# Patient Record
Sex: MALE | Race: NATIVE HAWAIIAN OR OTHER PACIFIC ISLANDER | Employment: FULL TIME | URBAN - METROPOLITAN AREA
[De-identification: names, ages, dates, MRNs, and addresses within clinical notes are randomized per-mention and may not be internally consistent; named-entity substitution may affect disease eponyms.]

---

## 2017-05-03 ENCOUNTER — HOSPITAL ENCOUNTER (EMERGENCY)
Facility: CLINIC | Age: 47
Discharge: HOME | End: 2017-05-03
Attending: EMERGENCY MEDICINE | Admitting: EMERGENCY MEDICINE
Payer: COMMERCIAL

## 2017-05-03 VITALS
DIASTOLIC BLOOD PRESSURE: 94 MMHG | SYSTOLIC BLOOD PRESSURE: 125 MMHG | TEMPERATURE: 98 F | OXYGEN SATURATION: 97 % | RESPIRATION RATE: 20 BRPM

## 2017-05-03 DIAGNOSIS — M54.9 ACUTE BACK PAIN, UNSPECIFIED BACK LOCATION, UNSPECIFIED BACK PAIN LATERALITY: ICD-10-CM

## 2017-05-03 LAB
ALBUMIN UR-MCNC: NEGATIVE MG/DL
ANION GAP SERPL CALCULATED.3IONS-SCNC: 6 MMOL/L (ref 3–14)
APPEARANCE UR: CLEAR
BASOPHILS # BLD AUTO: 0.1 10E9/L (ref 0–0.2)
BASOPHILS NFR BLD AUTO: 0.6 %
BILIRUB UR QL STRIP: NEGATIVE
BUN SERPL-MCNC: 16 MG/DL (ref 7–30)
CALCIUM SERPL-MCNC: 8.9 MG/DL (ref 8.5–10.1)
CHLORIDE SERPL-SCNC: 106 MMOL/L (ref 94–109)
CO2 SERPL-SCNC: 28 MMOL/L (ref 20–32)
COLOR UR AUTO: YELLOW
CREAT SERPL-MCNC: 1 MG/DL (ref 0.66–1.25)
DIFFERENTIAL METHOD BLD: NORMAL
EOSINOPHIL # BLD AUTO: 0.3 10E9/L (ref 0–0.7)
EOSINOPHIL NFR BLD AUTO: 3.1 %
ERYTHROCYTE [DISTWIDTH] IN BLOOD BY AUTOMATED COUNT: 12.6 % (ref 10–15)
GFR SERPL CREATININE-BSD FRML MDRD: 79 ML/MIN/1.7M2
GLUCOSE SERPL-MCNC: 113 MG/DL (ref 70–99)
GLUCOSE UR STRIP-MCNC: NEGATIVE MG/DL
HCT VFR BLD AUTO: 42.8 % (ref 40–53)
HGB BLD-MCNC: 14.5 G/DL (ref 13.3–17.7)
HGB UR QL STRIP: NEGATIVE
IMM GRANULOCYTES # BLD: 0 10E9/L (ref 0–0.4)
IMM GRANULOCYTES NFR BLD: 0.3 %
KETONES UR STRIP-MCNC: NEGATIVE MG/DL
LEUKOCYTE ESTERASE UR QL STRIP: NEGATIVE
LYMPHOCYTES # BLD AUTO: 2.5 10E9/L (ref 0.8–5.3)
LYMPHOCYTES NFR BLD AUTO: 28.1 %
MCH RBC QN AUTO: 29 PG (ref 26.5–33)
MCHC RBC AUTO-ENTMCNC: 33.9 G/DL (ref 31.5–36.5)
MCV RBC AUTO: 86 FL (ref 78–100)
MONOCYTES # BLD AUTO: 1.1 10E9/L (ref 0–1.3)
MONOCYTES NFR BLD AUTO: 12.6 %
MUCOUS THREADS #/AREA URNS LPF: PRESENT /LPF
NEUTROPHILS # BLD AUTO: 4.9 10E9/L (ref 1.6–8.3)
NEUTROPHILS NFR BLD AUTO: 55.3 %
NITRATE UR QL: NEGATIVE
NRBC # BLD AUTO: 0 10*3/UL
NRBC BLD AUTO-RTO: 0 /100
PH UR STRIP: 6 PH (ref 5–7)
PLATELET # BLD AUTO: 349 10E9/L (ref 150–450)
POTASSIUM SERPL-SCNC: 3.5 MMOL/L (ref 3.4–5.3)
RBC # BLD AUTO: 5 10E12/L (ref 4.4–5.9)
RBC #/AREA URNS AUTO: <1 /HPF (ref 0–2)
SODIUM SERPL-SCNC: 140 MMOL/L (ref 133–144)
SP GR UR STRIP: 1.02 (ref 1–1.03)
SQUAMOUS #/AREA URNS AUTO: <1 /HPF (ref 0–1)
URN SPEC COLLECT METH UR: ABNORMAL
UROBILINOGEN UR STRIP-MCNC: 0 MG/DL (ref 0–2)
WBC # BLD AUTO: 8.8 10E9/L (ref 4–11)
WBC #/AREA URNS AUTO: <1 /HPF (ref 0–2)

## 2017-05-03 PROCEDURE — 99285 EMERGENCY DEPT VISIT HI MDM: CPT | Mod: 25

## 2017-05-03 PROCEDURE — 96361 HYDRATE IV INFUSION ADD-ON: CPT

## 2017-05-03 PROCEDURE — 81001 URINALYSIS AUTO W/SCOPE: CPT | Performed by: EMERGENCY MEDICINE

## 2017-05-03 PROCEDURE — 96374 THER/PROPH/DIAG INJ IV PUSH: CPT

## 2017-05-03 PROCEDURE — 85025 COMPLETE CBC W/AUTO DIFF WBC: CPT | Performed by: EMERGENCY MEDICINE

## 2017-05-03 PROCEDURE — 80048 BASIC METABOLIC PNL TOTAL CA: CPT | Performed by: EMERGENCY MEDICINE

## 2017-05-03 PROCEDURE — 25000128 H RX IP 250 OP 636: Performed by: EMERGENCY MEDICINE

## 2017-05-03 RX ORDER — SODIUM CHLORIDE 9 MG/ML
1000 INJECTION, SOLUTION INTRAVENOUS CONTINUOUS
Status: DISCONTINUED | OUTPATIENT
Start: 2017-05-03 | End: 2017-05-03 | Stop reason: HOSPADM

## 2017-05-03 RX ORDER — METHYLPREDNISOLONE 4 MG
4 TABLET, DOSE PACK ORAL SEE ADMIN INSTRUCTIONS
Qty: 21 TABLET | Refills: 0 | Status: SHIPPED | OUTPATIENT
Start: 2017-05-03 | End: 2017-11-18

## 2017-05-03 RX ORDER — HYDROMORPHONE HYDROCHLORIDE 1 MG/ML
0.5 INJECTION, SOLUTION INTRAMUSCULAR; INTRAVENOUS; SUBCUTANEOUS
Status: DISCONTINUED | OUTPATIENT
Start: 2017-05-03 | End: 2017-05-03 | Stop reason: HOSPADM

## 2017-05-03 RX ORDER — IBUPROFEN 800 MG/1
800 TABLET, FILM COATED ORAL EVERY 8 HOURS PRN
Qty: 60 TABLET | Refills: 0 | Status: SHIPPED | OUTPATIENT
Start: 2017-05-03 | End: 2017-05-11

## 2017-05-03 RX ORDER — CYCLOBENZAPRINE HCL 10 MG
10 TABLET ORAL 3 TIMES DAILY PRN
Qty: 20 TABLET | Refills: 0 | Status: SHIPPED | OUTPATIENT
Start: 2017-05-03 | End: 2017-05-09

## 2017-05-03 RX ADMIN — SODIUM CHLORIDE 1000 ML: 9 INJECTION, SOLUTION INTRAVENOUS at 17:38

## 2017-05-03 RX ADMIN — HYDROMORPHONE HYDROCHLORIDE 0.5 MG: 1 INJECTION, SOLUTION INTRAMUSCULAR; INTRAVENOUS; SUBCUTANEOUS at 17:38

## 2017-05-03 NOTE — ED NOTES
Pt had sudden onset of left flank pain this AM which radiates around to anterior abdomen and down to buttock.

## 2017-05-03 NOTE — ED AVS SNAPSHOT
[facility] Emergency Department    [address]    [facility] [address]    Phone:  [phone]    Fax:  [phone]                                       Zeke Treadwell   MRN: [id number]    Department:  [facility] Emergency Department   Date of Visit:  5/3/2017           After Visit Summary Signature Page     I have received my discharge instructions, and my questions have been answered. I have discussed any challenges I see with this plan with the nurse or doctor.    ..........................................................................................................................................  Patient/Patient Representative Signature      ..........................................................................................................................................  Patient Representative Print Name and Relationship to Patient    ..................................................               ................................................  Date                                            Time    ..........................................................................................................................................  Reviewed by Signature/Title    ...................................................              ..............................................  Date                                                            Time

## 2017-05-03 NOTE — LETTER
[facility] EMERGENCY DEPARTMENT  [address]  [place] [address]  [phone]    May 3, 2017    Zeke Treadwell  [address] [place] [address]  [phone] (home)     : 1970      To Whom it may concern:    Zkee Treadwell was seen in our Emergency Department today, May 3, 2017.  I expect his condition to improve over the next 3 days.  He may return to work/school when improved.    Sincerely,        Lena Malone

## 2017-05-03 NOTE — ED AVS SNAPSHOT
[facility] Emergency Department    [address] [place] [address]    Phone:  [phone]    Fax:  [phone]                                       Zeke Treadwell   MRN: [id number]    Department:  [facility] Emergency Department   Date of Visit:  5/3/2017           Patient Information     Date Of Birth          1970        Your diagnoses for this visit were:     Acute back pain, unspecified back location, unspecified back pain laterality        You were seen by Sharonda Vences MD and Martinez Delgado MD.      Follow-up Information     Follow up with your doctor. Call in 3 days.        Discharge Instructions         Discharge Instructions  Back Pain  You were seen today for back pain. Back pain can have many causes, but most will get better without surgery or other specific treatment. Sometimes there is a herniated ( slipped ) disc. We don t usually do MRI scans to look for these right away, since most herniated discs will get better on their own with time.  Today, we did not find any evidence that your back pain was caused by a serious condition, such as an infection, fracture, or tumor. However, sometimes symptoms develop over time and cannot be found during an emergency visit, so it is very important that you follow up with your primary doctor.  Return to the Emergency Department if:    You develop a fever with your back pain.     You have weakness or change in sensation in one or both legs.    You lose control of your bowels or bladder, or can t empty your bladder.    Your pain gets much worse.     Follow-up with your doctor:    Unless your pain has completely gone away, please make an appointment with your doctor within one week.  You may need further management of your back pain, such as more pain medication, imaging such as an X-ray or MRI, or physical therapy.    What can I do to help myself?    Remain Active -- People are often afraid that they will  hurt their back further or delay recovery by remaining active, but this is one of the best things you can do for your back. In fact, prolonged bed rest is not recommended. Studies have shown that people with low back pain recover faster when they remain active. Movement helps to bring blood flow to the muscles and relieve muscle spasms as well as preventing loss of muscle strength.    Heat -- Using a heating pad can help with low back pain during the first few weeks. Do not sleep with a heating pad, as you can be burned.     Pain medications - You may take a pain medication such as Tylenol  (acetaminophen), Advil , Nuprin  (ibuprofen) or Aleve  (naproxen).  If you have been given a narcotic such as Vicodin  (hydrocodone with acetaminophen), Percocet  (oxycodone with acetaminophen), codeine, or a muscle relaxant such as Flexeril  (cyclobenzaprine) or Soma  (carisoprodol), do not drive for four hours after you have taken it. If the narcotic contains Tylenol  (acetaminophen), do not take Tylenol  with it. All narcotics will cause constipation, so eat a high fiber diet.   If you were given a prescription for medicine here today, be sure to read all of the information (including the package insert) that comes with your prescription.  This will include important information about the medicine, its side effects, and any warnings that you need to know about.  The pharmacist who fills the prescription can provide more information and answer questions you may have about the medicine.  If you have questions or concerns that the pharmacist cannot address, please call or return to the Emergency Department.   Opioid Medication Information    Pain medications are among the most commonly prescribed medicines, so we are including this information for all our patients. If you did not receive pain medication or get a prescription for pain medicine, you can ignore it.     You may have been given a prescription for an opioid (narcotic)  pain medicine and/or have received a pain medicine while here in the Emergency Department. These medicines can make you drowsy or impaired. You must not drive, operate dangerous equipment, or engage in any other dangerous activities while taking these medications. If you drive while taking these medications, you could be arrested for DUI, or driving under the influence. Do not drink any alcohol while you are taking these medications.     Opioid pain medications can cause addiction. If you have a history of chemical dependency of any type, you are at a higher risk of becoming addicted to pain medications.  Only take these prescribed medications to treat your pain when all other options have been tried. Take it for as short a time and as few doses as possible. Store your pain pills in a secure place, as they are frequently stolen and provide a dangerous opportunity for children or visitors in your house to start abusing these powerful medications. We will not replace any lost or stolen medicine.  As soon as your pain is better, you should flush all your remaining medication.     Many prescription pain medications contain Tylenol  (acetaminophen), including Vicodin , Tylenol #3 , Norco , Lortab , and Percocet .  You should not take any extra pills of Tylenol  if you are using these prescription medications or you can get very sick.  Do not ever take more than 3000 mg of acetaminophen in any 24 hour period.    All opioids tend to cause constipation. Drink plenty of water and eat foods that have a lot of fiber, such as fruits, vegetables, prune juice, apple juice and high fiber cereal.  Take a laxative if you don t move your bowels at least every other day. Miralax , Milk of Magnesia, Colace , or Senna  can be used to keep you regular.      Remember that you can always come back to the Emergency Department if you are not able to see your regular doctor in the amount of time listed above, if you get any new symptoms, or  if there is anything that worries you.        24 Hour Appointment Hotline       To make an appointment at any [facility], call [phone] ([phone]). If you don't have a family doctor or clinic, we will help you find one. [facility] clinics are conveniently located to serve the needs of you and your family.             Review of your medicines      START taking        Dose / Directions Last dose taken    cyclobenzaprine 10 MG tablet   Commonly known as:  FLEXERIL   Dose:  10 mg   Quantity:  20 tablet        Take 1 tablet (10 mg) by mouth 3 times daily as needed for muscle spasms   Refills:  0        ibuprofen 800 MG tablet   Commonly known as:  ADVIL/MOTRIN   Dose:  800 mg   Quantity:  60 tablet        Take 1 tablet (800 mg) by mouth every 8 hours as needed for moderate pain   Refills:  0        methylPREDNISolone 4 MG tablet   Commonly known as:  MEDROL DOSEPAK   Dose:  4 mg   Quantity:  21 tablet        Take 1 tablet (4 mg) by mouth See Admin Instructions   Refills:  0                Prescriptions were sent or printed at these locations (3 Prescriptions)                   Other Prescriptions                Printed at Department/Unit printer (3 of 3)         cyclobenzaprine (FLEXERIL) 10 MG tablet               methylPREDNISolone (MEDROL DOSEPAK) 4 MG tablet               ibuprofen (ADVIL/MOTRIN) 800 MG tablet                Procedures and tests performed during your visit     Basic metabolic panel    CBC with platelets differential    UA with Microscopic      Orders Needing Specimen Collection     None      Pending Results     No orders found from 5/1/2017 to 5/4/2017.            Pending Culture Results     No orders found from 5/1/2017 to 5/4/2017.            Pending Results Instructions     If you had any lab results that were not finalized at the time of your Discharge, you can call the ED Lab Result RN at [phone]. You will be contacted by this team for any positive Lab results or changes in  treatment. The nurses are available 7 days a week from 10A to 6:30P.  You can leave a message 24 hours per day and they will return your call.        Test Results From Your Hospital Stay        5/3/2017  5:13 PM      Component Results     Component Value Ref Range & Units Status    Color Urine Yellow  Final    Appearance Urine Clear  Final    Glucose Urine Negative NEG mg/dL Final    Bilirubin Urine Negative NEG Final    Ketones Urine Negative NEG mg/dL Final    Specific Gravity Urine 1.024 1.003 - 1.035 Final    Blood Urine Negative NEG Final    pH Urine 6.0 5.0 - 7.0 pH Final    Protein Albumin Urine Negative NEG mg/dL Final    Urobilinogen mg/dL 0.0 0.0 - 2.0 mg/dL Final    Nitrite Urine Negative NEG Final    Leukocyte Esterase Urine Negative NEG Final    Source Midstream Urine  Final    WBC Urine <1 0 - 2 /HPF Final    RBC Urine <1 0 - 2 /HPF Final    Squamous Epithelial /HPF Urine <1 0 - 1 /HPF Final    Mucous Urine Present (A) NEG /LPF Final         5/3/2017  5:46 PM      Component Results     Component Value Ref Range & Units Status    WBC 8.8 4.0 - 11.0 10e9/L Final    RBC Count 5.00 4.4 - 5.9 10e12/L Final    Hemoglobin 14.5 13.3 - 17.7 g/dL Final    Hematocrit 42.8 40.0 - 53.0 % Final    MCV 86 78 - 100 fl Final    MCH 29.0 26.5 - 33.0 pg Final    MCHC 33.9 31.5 - 36.5 g/dL Final    RDW 12.6 10.0 - 15.0 % Final    Platelet Count 349 150 - 450 10e9/L Final    Diff Method Automated Method  Final    % Neutrophils 55.3 % Final    % Lymphocytes 28.1 % Final    % Monocytes 12.6 % Final    % Eosinophils 3.1 % Final    % Basophils 0.6 % Final    % Immature Granulocytes 0.3 % Final    Nucleated RBCs 0 0 /100 Final    Absolute Neutrophil 4.9 1.6 - 8.3 10e9/L Final    Absolute Lymphocytes 2.5 0.8 - 5.3 10e9/L Final    Absolute Monocytes 1.1 0.0 - 1.3 10e9/L Final    Absolute Eosinophils 0.3 0.0 - 0.7 10e9/L Final    Absolute Basophils 0.1 0.0 - 0.2 10e9/L Final    Abs Immature Granulocytes 0.0 0 - 0.4 10e9/L Final     Absolute Nucleated RBC 0.0  Final         5/3/2017  6:02 PM      Component Results     Component Value Ref Range & Units Status    Sodium 140 133 - 144 mmol/L Final    Potassium 3.5 3.4 - 5.3 mmol/L Final    Chloride 106 94 - 109 mmol/L Final    Carbon Dioxide 28 20 - 32 mmol/L Final    Anion Gap 6 3 - 14 mmol/L Final    Glucose 113 (H) 70 - 99 mg/dL Final    Urea Nitrogen 16 7 - 30 mg/dL Final    Creatinine 1.00 0.66 - 1.25 mg/dL Final    GFR Estimate 79 >60 mL/min/1.7m2 Final    Non  GFR Calc    GFR Estimate If Black >90   GFR Calc   >60 mL/min/1.7m2 Final    Calcium 8.9 8.5 - 10.1 mg/dL Final                Clinical Quality Measure: Blood Pressure Screening     Your blood pressure was checked while you were in the emergency department today. The last reading we obtained was  BP: (!) 126/91 . Please read the guidelines below about what these numbers mean and what you should do about them.  If your systolic blood pressure (the top number) is less than 120 and your diastolic blood pressure (the bottom number) is less than 80, then your blood pressure is normal. There is nothing more that you need to do about it.  If your systolic blood pressure (the top number) is 120-139 or your diastolic blood pressure (the bottom number) is 80-89, your blood pressure may be higher than it should be. You should have your blood pressure rechecked within a year by a primary care provider.  If your systolic blood pressure (the top number) is 140 or greater or your diastolic blood pressure (the bottom number) is 90 or greater, you may have high blood pressure. High blood pressure is treatable, but if left untreated over time it can put you at risk for heart attack, stroke, or kidney failure. You should have your blood pressure rechecked by a primary care provider within the next 4 weeks.  If your provider in the emergency department today gave you specific instructions to follow-up with your doctor or  "provider even sooner than that, you should follow that instruction and not wait for up to 4 weeks for your follow-up visit.        Thank you for choosing [facility]       Thank you for choosing [facility] for your care. Our goal is always to provide you with excellent care. Hearing back from our patients is one way we can continue to improve our services. Please take a few minutes to complete the written survey that you may receive in the mail after you visit with us. Thank you!        [employer] Information     [employer] lets you send messages to your doctor, view your test results, renew your prescriptions, schedule appointments and more. To sign up, go to [email or web address] . Click on \"Log in\" on the left side of the screen, which will take you to the Welcome page. Then click on \"Sign up Now\" on the right side of the page.     You will be asked to enter the access code listed below, as well as some personal information. Please follow the directions to create your username and password.     Your access code is: [email or web address]  Expires: 2017  6:14 PM     Your access code will  in 90 days. If you need help or a new code, please call your [facility] clinic or [phone].        Care EveryWhere ID     This is your Care EveryWhere ID. This could be used by other organizations to access your [facility] medical records  [id number]        After Visit Summary       This is your record. Keep this with you and show to your community pharmacist(s) and doctor(s) at your next visit.                  "

## 2017-05-04 NOTE — ED PROVIDER NOTES
CHIEF COMPLAINT:  Flank and back pain.      HISTORY OF PRESENT ILLNESS:  Zeke Treadwell is a 47-year-old male that came in complaining of lower back pain that has been ongoing since earlier today.  The patient describes it as a nerve pain on his left lower back flank radiating down to his legs.  He notes no heavy lifting or twisting to attribute to symptoms although pain is worsened with movement.  The patient denies any weakness, numbness, loss of control of bladder or bowel.  No dysuria or hematuria or fevers.  The patient has no previous history of back pain.      ALLERGIES:  No known drug allergies.      HOME MEDICATIONS:  None.      PAST MEDICAL HISTORY:  Appendectomy.      SOCIAL HISTORY:  The patient does not smoke cigarettes, uses alcohol socially.      REVIEW OF SYSTEMS:  Positive for back pain.  All other review of systems are negative.      PHYSICAL EXAMINATION:   VITAL SIGNS:  Temperature 98, pulse 85, respiratory rate 20, blood pressure 140/103, pulse ox 96% on room air.   GENERAL:  The patient is in minor distress secondary due to pain.   NECK:  Supple.   HEART:  S1, S2, regular rate and rhythm, no murmurs, rubs or gallops.   LUNGS:  Clear to auscultation bilaterally, no wheezing, rales or rhonchi.   ABDOMEN:  Bowel sounds are positive, nontender, nondistended, no organomegaly.  No pain on percussion of his flanks.   MUSCULOSKELETAL:  No midline spinal tenderness.  He does have slight left-sided sacral paraspinal tenderness.  Straight leg raise is negative for 2+ distal pulses.   NEUROLOGIC:  The patient is alert and oriented x3, has 5/5 strength.  Sensation intact to light touch in all 4 extremities.  No rashes noted.      EMERGENCY DEPARTMENT COURSE:  The patient was seen by ED physician, ED nurse.  All findings were reviewed.  All questions were answered.  The patient was noted to be improved.  The patient was discharged home.      INTERVENTIONS:   1.  Normal saline 1 liter IV.   2.  Dilaudid  0.5 mg IV.      MEDICAL DECISION MAKING:  This is a 47-year-old male that came in complaining of back pain, flank pain.  Differential includes renal colic, pyelonephritis, prostatitis, musculoskeletal back pain or other causes.  I did end up performing lab workup, which shows no hematuria.  Lab work is otherwise reassuring.  Pain radiates down into his leg and is reproducible with movement, likely musculoskeletal.  I do not believe patient needs CT imaging at this time.  Suspicion of renal colic is low.  He did have improvement after interventions.  At this time, I do believe he is safe for discharge.  No weakness, numbness, loss of control of bladder or bowel.  No concerns for cauda equina and neurologic compromise.  He is told to return were further worsening symptoms.      DISPOSITION:  Home.  Follow up with PMD.      DIAGNOSIS:  Acute back pain.         JUDIE BACON MD             D: 2017 18:22   T: 2017 06:54   MT: [id number]      Name:     FRITZ MULLINS   MRN:      [id number]        Account:      [id number]   :      1970           Visit Date:   2017      Document: [id number]

## 2017-11-18 ENCOUNTER — HOSPITAL ENCOUNTER (EMERGENCY)
Facility: CLINIC | Age: 47
Discharge: HOME | End: 2017-11-18
Attending: EMERGENCY MEDICINE | Admitting: EMERGENCY MEDICINE
Payer: COMMERCIAL

## 2017-11-18 ENCOUNTER — APPOINTMENT (OUTPATIENT)
Dept: CT IMAGING | Facility: CLINIC | Age: 47
End: 2017-11-18
Attending: EMERGENCY MEDICINE
Payer: COMMERCIAL

## 2017-11-18 VITALS
RESPIRATION RATE: 18 BRPM | DIASTOLIC BLOOD PRESSURE: 81 MMHG | HEART RATE: 109 BPM | WEIGHT: 199.52 LBS | OXYGEN SATURATION: 96 % | TEMPERATURE: 99.5 F | SYSTOLIC BLOOD PRESSURE: 128 MMHG

## 2017-11-18 DIAGNOSIS — L03.211 FACIAL CELLULITIS: ICD-10-CM

## 2017-11-18 LAB
ANION GAP SERPL CALCULATED.3IONS-SCNC: 6 MMOL/L (ref 3–14)
BASOPHILS # BLD AUTO: 0.1 10E9/L (ref 0–0.2)
BASOPHILS NFR BLD AUTO: 0.3 %
BUN SERPL-MCNC: 10 MG/DL (ref 7–30)
CALCIUM SERPL-MCNC: 8.7 MG/DL (ref 8.5–10.1)
CHLORIDE SERPL-SCNC: 102 MMOL/L (ref 94–109)
CO2 SERPL-SCNC: 27 MMOL/L (ref 20–32)
CREAT SERPL-MCNC: 1.02 MG/DL (ref 0.66–1.25)
CRP SERPL-MCNC: 88.9 MG/L (ref 0–8)
DIFFERENTIAL METHOD BLD: ABNORMAL
EOSINOPHIL # BLD AUTO: 0.1 10E9/L (ref 0–0.7)
EOSINOPHIL NFR BLD AUTO: 0.4 %
ERYTHROCYTE [DISTWIDTH] IN BLOOD BY AUTOMATED COUNT: 12.6 % (ref 10–15)
GFR SERPL CREATININE-BSD FRML MDRD: 77 ML/MIN/1.7M2
GLUCOSE SERPL-MCNC: 138 MG/DL (ref 70–99)
HCT VFR BLD AUTO: 44.4 % (ref 40–53)
HGB BLD-MCNC: 15.3 G/DL (ref 13.3–17.7)
IMM GRANULOCYTES # BLD: 0.1 10E9/L (ref 0–0.4)
IMM GRANULOCYTES NFR BLD: 0.4 %
LYMPHOCYTES # BLD AUTO: 1.7 10E9/L (ref 0.8–5.3)
LYMPHOCYTES NFR BLD AUTO: 8.5 %
MCH RBC QN AUTO: 29.6 PG (ref 26.5–33)
MCHC RBC AUTO-ENTMCNC: 34.5 G/DL (ref 31.5–36.5)
MCV RBC AUTO: 86 FL (ref 78–100)
MONOCYTES # BLD AUTO: 2.1 10E9/L (ref 0–1.3)
MONOCYTES NFR BLD AUTO: 10.4 %
NEUTROPHILS # BLD AUTO: 15.9 10E9/L (ref 1.6–8.3)
NEUTROPHILS NFR BLD AUTO: 80 %
NRBC # BLD AUTO: 0 10*3/UL
NRBC BLD AUTO-RTO: 0 /100
PLATELET # BLD AUTO: 295 10E9/L (ref 150–450)
POTASSIUM SERPL-SCNC: 3.8 MMOL/L (ref 3.4–5.3)
RBC # BLD AUTO: 5.17 10E12/L (ref 4.4–5.9)
SODIUM SERPL-SCNC: 135 MMOL/L (ref 133–144)
WBC # BLD AUTO: 19.9 10E9/L (ref 4–11)

## 2017-11-18 PROCEDURE — 36415 COLL VENOUS BLD VENIPUNCTURE: CPT | Performed by: EMERGENCY MEDICINE

## 2017-11-18 PROCEDURE — 25000128 H RX IP 250 OP 636: Performed by: EMERGENCY MEDICINE

## 2017-11-18 PROCEDURE — 96375 TX/PRO/DX INJ NEW DRUG ADDON: CPT

## 2017-11-18 PROCEDURE — 96366 THER/PROPH/DIAG IV INF ADDON: CPT

## 2017-11-18 PROCEDURE — 99285 EMERGENCY DEPT VISIT HI MDM: CPT | Mod: 25

## 2017-11-18 PROCEDURE — 25000132 ZZH RX MED GY IP 250 OP 250 PS 637: Performed by: EMERGENCY MEDICINE

## 2017-11-18 PROCEDURE — 96365 THER/PROPH/DIAG IV INF INIT: CPT

## 2017-11-18 PROCEDURE — 70486 CT MAXILLOFACIAL W/O DYE: CPT

## 2017-11-18 PROCEDURE — 80048 BASIC METABOLIC PNL TOTAL CA: CPT | Performed by: EMERGENCY MEDICINE

## 2017-11-18 PROCEDURE — 85025 COMPLETE CBC W/AUTO DIFF WBC: CPT | Performed by: EMERGENCY MEDICINE

## 2017-11-18 PROCEDURE — 86140 C-REACTIVE PROTEIN: CPT | Performed by: EMERGENCY MEDICINE

## 2017-11-18 RX ORDER — OXYCODONE HYDROCHLORIDE 5 MG/1
5-10 TABLET ORAL EVERY 6 HOURS PRN
Qty: 12 TABLET | Refills: 0 | Status: SHIPPED | OUTPATIENT
Start: 2017-11-18 | End: 2018-11-21

## 2017-11-18 RX ORDER — KETOROLAC TROMETHAMINE 15 MG/ML
15 INJECTION, SOLUTION INTRAMUSCULAR; INTRAVENOUS ONCE
Status: COMPLETED | OUTPATIENT
Start: 2017-11-18 | End: 2017-11-18

## 2017-11-18 RX ORDER — AMPICILLIN AND SULBACTAM 2; 1 G/1; G/1
3 INJECTION, POWDER, FOR SOLUTION INTRAMUSCULAR; INTRAVENOUS ONCE
Status: COMPLETED | OUTPATIENT
Start: 2017-11-18 | End: 2017-11-18

## 2017-11-18 RX ORDER — OXYCODONE HYDROCHLORIDE 5 MG/1
5 TABLET ORAL ONCE
Status: COMPLETED | OUTPATIENT
Start: 2017-11-18 | End: 2017-11-18

## 2017-11-18 RX ORDER — SULFAMETHOXAZOLE/TRIMETHOPRIM 800-160 MG
1 TABLET ORAL 2 TIMES DAILY
COMMUNITY
End: 2018-11-21

## 2017-11-18 RX ADMIN — KETOROLAC TROMETHAMINE 15 MG: 15 INJECTION, SOLUTION INTRAMUSCULAR; INTRAVENOUS at 21:54

## 2017-11-18 RX ADMIN — OXYCODONE HYDROCHLORIDE 5 MG: 5 TABLET ORAL at 21:54

## 2017-11-18 RX ADMIN — AMPICILLIN SODIUM AND SULBACTAM SODIUM 3 G: 2; 1 INJECTION, POWDER, FOR SOLUTION INTRAMUSCULAR; INTRAVENOUS at 21:53

## 2017-11-18 ASSESSMENT — ENCOUNTER SYMPTOMS
FACIAL SWELLING: 1
FEVER: 1

## 2017-11-18 NOTE — ED AVS SNAPSHOT
[facility] Emergency Department    [address] [place] [address]    Phone:  [phone]    Fax:  [phone]                                       Zeke Treadwell   MRN: [id number]    Department:  [facility] Emergency Department   Date of Visit:  11/18/2017           Patient Information     Date Of Birth          1970        Your diagnoses for this visit were:     Facial cellulitis        You were seen by Mustapha Grider MD.      Follow-up Information     Follow up with [facility].    Why:  Monday for recheck     Contact information:    [address] [place] [address]  [phone]          Discharge Instructions       Discharge Instructions  Cellulitis    Cellulitis is an infection of the skin that occurs when bacteria enter the skin.   Symptoms are generally redness, swelling, warmth and pain.  Your infection appeared to be appropriate to treat at home with antibiotics.  However, sometimes your infection may be worse than it seemed at first, or may worsen with time. If you have new or worse symptoms, you may need to be seen again in the Emergency Department or by your primary provider.    Generally, every Emergency Department visit should have a follow-up clinic visit with either a primary or a specialty clinic/provider. Please follow-up as instructed by your emergency provider today.    Return to the Emergency Department if:    The redness, pain, or swelling gets a lot worse.  If the red area was marked, return if it is red significantly beyond the marked area.    You are unable to get your antibiotics, or are vomiting (throwing up) these pills, or you cannot take them.    You are feeling more ill, weak or lightheaded.    You start to run a new fever (temperature >101 F).    Anything else about the infection worries or concerns you.  Treatment:    Start your antibiotics right away, and take them as prescribed. Be sure to finish the whole  prescription, even if you are better.    Apply a heating pad, warm packs, or warm water soaks to the infected area for 15 minutes at a time, at least 3 times a day. Do not use a heating pad on your feet or legs if you have diabetes. Do not sleep with a heating pad on, since this can cause burns or skin injury.    Rest your injured area for at least 1-2 days. After that you may start using your extremity again as long as there is not too much pain.     Raise the injured area above the level of your heart as much as possible in the first 1-2 days.    Tylenol  (acetaminophen), Motrin  (ibuprofen), or Advil  (ibuprofen) may help may help reduce pain and fever and may help you feel more comfortable. Be sure to read and follow the package directions, and ask your provider if you have questions.    If you were given a prescription for medicine here today, be sure to read all of the information (including the package insert) that comes with your prescription.  This will include important information about the medicine, its side effects, and any warnings that you need to know about.  The pharmacist who fills the prescription can provide more information and answer questions you may have about the medicine.  If you have questions or concerns that the pharmacist cannot address, please call or return to the Emergency Department.     Remember that you can always come back to the Emergency Department if you are not able to see your regular provider in the amount of time listed above, if you get any new symptoms, or if there is anything that worries you.      24 Hour Appointment Hotline       To make an appointment at any [facility] [place], call [phone] ([phone]). If you don't have a family doctor or clinic, we will help you find one. [facility] are conveniently located to serve the needs of you and your family.             Review of your medicines      START taking        Dose / Directions Last dose taken     amoxicillin-clavulanate 875-125 MG per tablet   Commonly known as:  AUGMENTIN   Dose:  1 tablet   Quantity:  14 tablet        Take 1 tablet by mouth 2 times daily for 7 days   Refills:  0        oxyCODONE IR 5 MG tablet   Commonly known as:  ROXICODONE   Dose:  5-10 mg   Quantity:  12 tablet        Take 1-2 tablets (5-10 mg) by mouth every 6 hours as needed for pain   Refills:  0          Our records show that you are taking the medicines listed below. If these are incorrect, please call your family doctor or clinic.        Dose / Directions Last dose taken    sulfamethoxazole-trimethoprim 800-160 MG per tablet   Commonly known as:  BACTRIM DS/SEPTRA DS   Dose:  1 tablet        Take 1 tablet by mouth 2 times daily   Refills:  0                Prescriptions were sent or printed at these locations (2 Prescriptions)                   Other Prescriptions                Printed at Department/Unit printer (2 of 2)         amoxicillin-clavulanate (AUGMENTIN) 875-125 MG per tablet               oxyCODONE IR (ROXICODONE) 5 MG tablet                Procedures and tests performed during your visit     Basic metabolic panel    CBC + differential    CRP inflammation    Maxillofacial  CT w/o contrast      Orders Needing Specimen Collection     None      Pending Results     Date and Time Order Name Status Description    11/18/2017 2125 Maxillofacial  CT w/o contrast Preliminary             Pending Culture Results     No orders found from 11/16/2017 to 11/19/2017.            Pending Results Instructions     If you had any lab results that were not finalized at the time of your Discharge, you can call the ED Lab Result RN at [phone]. You will be contacted by this team for any positive Lab results or changes in treatment. The nurses are available 7 days a week from 10A to 6:30P.  You can leave a message 24 hours per day and they will return your call.        Test Results From Your Hospital Stay        11/18/2017  9:58 PM       Component Results     Component Value Ref Range & Units Status    WBC 19.9 (H) 4.0 - 11.0 10e9/L Final    RBC Count 5.17 4.4 - 5.9 10e12/L Final    Hemoglobin 15.3 13.3 - 17.7 g/dL Final    Hematocrit 44.4 40.0 - 53.0 % Final    MCV 86 78 - 100 fl Final    MCH 29.6 26.5 - 33.0 pg Final    MCHC 34.5 31.5 - 36.5 g/dL Final    RDW 12.6 10.0 - 15.0 % Final    Platelet Count 295 150 - 450 10e9/L Final    Diff Method Automated Method  Final    % Neutrophils 80.0 % Final    % Lymphocytes 8.5 % Final    % Monocytes 10.4 % Final    % Eosinophils 0.4 % Final    % Basophils 0.3 % Final    % Immature Granulocytes 0.4 % Final    Nucleated RBCs 0 0 /100 Final    Absolute Neutrophil 15.9 (H) 1.6 - 8.3 10e9/L Final    Absolute Lymphocytes 1.7 0.8 - 5.3 10e9/L Final    Absolute Monocytes 2.1 (H) 0.0 - 1.3 10e9/L Final    Absolute Eosinophils 0.1 0.0 - 0.7 10e9/L Final    Absolute Basophils 0.1 0.0 - 0.2 10e9/L Final    Abs Immature Granulocytes 0.1 0 - 0.4 10e9/L Final    Absolute Nucleated RBC 0.0  Final         11/18/2017 10:11 PM      Component Results     Component Value Ref Range & Units Status    CRP Inflammation 88.9 (H) 0.0 - 8.0 mg/L Final         11/18/2017  9:59 PM      Component Results     Component Value Ref Range & Units Status    Sodium 135 133 - 144 mmol/L Final    Potassium 3.8 3.4 - 5.3 mmol/L Final    Chloride 102 94 - 109 mmol/L Final    Carbon Dioxide 27 20 - 32 mmol/L Final    Anion Gap 6 3 - 14 mmol/L Final    Glucose 138 (H) 70 - 99 mg/dL Final    Urea Nitrogen 10 7 - 30 mg/dL Final    Creatinine 1.02 0.66 - 1.25 mg/dL Final    GFR Estimate 77 >60 mL/min/1.7m2 Final    Non  GFR Calc    GFR Estimate If Black >90 >60 mL/min/1.7m2 Final    African American GFR Calc    Calcium 8.7 8.5 - 10.1 mg/dL Final         11/18/2017 11:32 PM      Narrative     CT MAXILLOFACIAL W/O CONTRAST    11/18/2017 11:11 PM     HISTORY: Left facial swelling. Evaluate for cellulitis or abscess.    TECHNIQUE:  0.15 cm  thin sections were obtained without IV contrast in  the axial and coronal planes. Radiation dose for this scan was reduced  using automated exposure control, adjustment of the mA and/or kV  according to patient size, or iterative reconstruction technique.    COMPARISON: None.    FINDINGS: Mild soft tissue swelling in the nasal region could be  related to cellulitis. No fluid collections are identified to suggest  abscess. The mandible is intact and the mandibular condyles are in  their normal position. Visible portions of the upper cervical spine  are normal without fracture or malalignment. The paranasal sinuses  sinuses are clear. The orbits are intact and the extraocular muscles  are unremarkable. Limited evaluation of the intracranial structures  are unremarkable.        Impression     IMPRESSION:  1. Mild soft tissue swelling in the nasal region could represent  cellulitis.  2. Otherwise unremarkable maxillofacial CT scan. No evidence for  abscess.                 Clinical Quality Measure: Blood Pressure Screening     Your blood pressure was checked while you were in the emergency department today. The last reading we obtained was  BP: (!) 145/101 . Please read the guidelines below about what these numbers mean and what you should do about them.  If your systolic blood pressure (the top number) is less than 120 and your diastolic blood pressure (the bottom number) is less than 80, then your blood pressure is normal. There is nothing more that you need to do about it.  If your systolic blood pressure (the top number) is 120-139 or your diastolic blood pressure (the bottom number) is 80-89, your blood pressure may be higher than it should be. You should have your blood pressure rechecked within a year by a primary care provider.  If your systolic blood pressure (the top number) is 140 or greater or your diastolic blood pressure (the bottom number) is 90 or greater, you may have high blood pressure. High blood  "pressure is treatable, but if left untreated over time it can put you at risk for heart attack, stroke, or kidney failure. You should have your blood pressure rechecked by a primary care provider within the next 4 weeks.  If your provider in the emergency department today gave you specific instructions to follow-up with your doctor or provider even sooner than that, you should follow that instruction and not wait for up to 4 weeks for your follow-up visit.        Thank you for choosing [facility]       Thank you for choosing [facility] for your care. Our goal is always to provide you with excellent care. Hearing back from our patients is one way we can continue to improve our services. Please take a few minutes to complete the written survey that you may receive in the mail after you visit with us. Thank you!        [employer] Information     [employer] lets you send messages to your doctor, view your test results, renew your prescriptions, schedule appointments and more. To sign up, go to [email or web address] . Click on \"Log in\" on the left side of the screen, which will take you to the Welcome page. Then click on \"Sign up Now\" on the right side of the page.     You will be asked to enter the access code listed below, as well as some personal information. Please follow the directions to create your username and password.     Your access code is: [email or web address]  Expires: 2018 11:42 PM     Your access code will  in 90 days. If you need help or a new code, please call your [facility] clinic or [phone].        Care EveryWhere ID     This is your Care EveryWhere ID. This could be used by other organizations to access your [facility] medical records  [id number]        Equal Access to Services     BOB JOHNSON : Gavi Mcintosh, wayne campbell, jazmine davison. So [facility] [phone].    ATENCIÓN: Si habla español, tiene a meng disposición servicios " rachid de asistencia lingüística. Joseph domínguez [phone].    We comply with applicable federal civil rights laws and [place] laws. We do not discriminate on the basis of race, color, national origin, age, disability, sex, sexual orientation, or gender identity.            After Visit Summary       This is your record. Keep this with you and show to your community pharmacist(s) and doctor(s) at your next visit.

## 2017-11-18 NOTE — LETTER
November 18, 2017      To Whom It May Concern:      Zeke Treadwell was seen in our Emergency Department today, 11/18/17.  I expect his condition to improve over the next 3 days.  He may return to work/school when improved.    Sincerely,        Ghislaine Vazquez RN

## 2017-11-18 NOTE — ED AVS SNAPSHOT
[facility] Emergency Department    [address]    [facility] [address]    Phone:  [phone]    Fax:  [phone]                                       Zeke Treadwell   MRN: [id number]    Department:  [facility] Emergency Department   Date of Visit:  11/18/2017           After Visit Summary Signature Page     I have received my discharge instructions, and my questions have been answered. I have discussed any challenges I see with this plan with the nurse or doctor.    ..........................................................................................................................................  Patient/Patient Representative Signature      ..........................................................................................................................................  Patient Representative Print Name and Relationship to Patient    ..................................................               ................................................  Date                                            Time    ..........................................................................................................................................  Reviewed by Signature/Title    ...................................................              ..............................................  Date                                                            Time

## 2017-11-19 NOTE — ED PROVIDER NOTES
History     Chief Complaint:  Facial Swelling and Pain    HPI   The patient's wife served as an  at the bedside as the patient's primary language is Mauritian     Zeke Treadwell is a generally healthy 47 year old male who presents to the emergency department for evaluation of facial swelling and pain. The patient states that he awoke with facial pain and swelling around the left side of his upper lip and around his nose three days ago. He was seen for these symptoms in urgent care later that day, at which time he had and I and D performed and was placed on a course of Bactrim, which he has been taking as prescribed. Following this visit, the patient has continued to have facial pain and feels that the swelling may be worse. This prompted his ED visit today. The patient reports subjective fevers at home, though has not measured his temperature.     Allergies:  No Known Allergies     Medications:    Bactrim - day 3    Past Medical History:    The patient denies any significant past medical history.    Past Surgical History:    appendectomy    Family History:    No past pertinent family history.    Social History:  Presents with his wife.   Negative for tobacco use.  Negative for alcohol use.  Marital Status:       Review of Systems   Constitutional: Positive for fever (Subjective).   HENT: Positive for facial swelling.         Positive for facial pain.      All other systems reviewed and are negative.    Physical Exam     Patient Vitals for the past 24 hrs:   BP Temp Temp src Pulse Resp SpO2 Weight   11/18/17 2347 - - - - - 96 % -   11/18/17 2346 128/81 - - - - - -   11/18/17 2245 - - - - - 95 % -   11/18/17 2230 (!) 137/98 - - - - 95 % -   11/18/17 2215 - - - - - 94 % -   11/18/17 2200 - - - - - 96 % -   11/18/17 2158 (!) 145/101 - - - - 97 % -   11/18/17 2044 (!) 151/105 99.5  F (37.5  C) Temporal 109 18 98 % 90.5 kg (199 lb 8.3 oz)       Physical Exam  Nursing note and vitals  reviewed.  Constitutional: Cooperative.   HENT:   Mouth/Throat: Mucous membranes are normal.   Cardiovascular: Normal rate, regular rhythm and normal heart sounds.  No murmur.  Pulmonary/Chest: Effort normal and breath sounds normal. No respiratory distress. No wheezes.  Neurological: Alert.   Skin: Skin is warm and dry. Decompressed, pustular lesion at the opening of left nare. Palpable soft tissue swelling deep to lesion, consistent with possible abscess. Mild soft tissue swelling to left maxillary prominence.    Psychiatric: Normal mood and affect.     Emergency Department Course   Imaging:  Radiographic findings were communicated with the patient who voiced understanding of the findings.    CT Maxillofacial w/o contrast:  1. Mild soft tissue swelling in the nasal region could represent cellulitis.  2. Otherwise unremarkable maxillofacial CT scan. No evidence for abscess.  As per radiology.     Laboratory:  CBC: WBC: 19.9 (H), HGB: 15.3, PLT: 295  BMP: Glucose 138 (H), o/w WNL (Creatinine: 1.02)    CRP inflammation: 88.9 (H)    Interventions:  2153 Unasyn 3 g IV  2154 Toradol, 15 mg, IV injection   Oxycodone 5 mg PO    Emergency Department Course:  Nursing notes and vitals reviewed. 2118 I performed an exam of the patient as documented above.     IV inserted. Medicine administered as documented above. Blood drawn. This was sent to the lab for further testing, results above.    The patient was sent for a Maxillofacial CT while in the emergency department, findings above.     2339 I rechecked the patient and discussed the results of his workup thus far. We discussed  admission for IV antibiotics versus outpatient management and the patient is requesting discharge home at this time, which I feel is reasonable.     Findings and plan explained to the Patient. Patient discharged home with instructions regarding supportive care, medications, and reasons to return. The importance of close follow-up was reviewed. The  patient was prescribed Augmentin and Oxycodone.     I personally reviewed the laboratory results with the Patient and answered all related questions prior to discharge.     Impression & Plan    Medical Decision Making:  Zeke Treadwell is a 47 year old male who has worsening facial cellulitis, despite being on oral Bactrim. He had a pustular lesion that was decompressed at the [facility], as noted above. Findings consistent with worsening facial cellulitis. He received a dose of Unasyn here. CT scan was obtained to look for abscess, which fortunately shows no evidence of a drainable fluid collection at this time. I discussed inpatient versus outpatient treatment options with this patient. They would very much like to go home and see how he is doing tomorrow. I feel that this is reasonable. We will change him to Augmentin and write a short course of pain medication. We will follow up with his primary care provider on Monday and otherwise return tomorrow if worsening.     Diagnosis:    ICD-10-CM   1. Facial cellulitis L03.211     Disposition:  discharged to home    Discharge Medications:  New Prescriptions    AMOXICILLIN-CLAVULANATE (AUGMENTIN) 875-125 MG PER TABLET    Take 1 tablet by mouth 2 times daily for 7 days    OXYCODONE IR (ROXICODONE) 5 MG TABLET    Take 1-2 tablets (5-10 mg) by mouth every 6 hours as needed for pain     IRosa, am serving as a scribe on 11/18/2017 at 9:18 PM to personally document services performed by Mustapha Grider MD based on my observations and the provider's statements to me.     Rosa Chacon  11/18/2017   [facility] EMERGENCY DEPARTMENT       Mustapha Grider MD  11/19/17 0017

## 2017-11-19 NOTE — ED NOTES
Seen at  Thursday for a left nasal area abscess. Was put on antibiotic and now today noted much more swelling to the face and jaw Able to swallow but feels the swelling continues. Teeth and lip very swollen but not apparent respiratory distress

## 2018-11-21 ENCOUNTER — HOSPITAL ENCOUNTER (EMERGENCY)
Facility: CLINIC | Age: 48
Discharge: HOME | End: 2018-11-21
Attending: EMERGENCY MEDICINE | Admitting: EMERGENCY MEDICINE

## 2018-11-21 ENCOUNTER — APPOINTMENT (OUTPATIENT)
Dept: GENERAL RADIOLOGY | Facility: CLINIC | Age: 48
End: 2018-11-21
Attending: EMERGENCY MEDICINE

## 2018-11-21 VITALS
RESPIRATION RATE: 14 BRPM | OXYGEN SATURATION: 98 % | TEMPERATURE: 99.6 F | SYSTOLIC BLOOD PRESSURE: 147 MMHG | DIASTOLIC BLOOD PRESSURE: 98 MMHG

## 2018-11-21 DIAGNOSIS — M25.512 ACUTE PAIN OF LEFT SHOULDER: ICD-10-CM

## 2018-11-21 LAB
ANION GAP SERPL CALCULATED.3IONS-SCNC: 7 MMOL/L (ref 3–14)
BUN SERPL-MCNC: 12 MG/DL (ref 7–30)
CALCIUM SERPL-MCNC: 8.7 MG/DL (ref 8.5–10.1)
CHLORIDE SERPL-SCNC: 106 MMOL/L (ref 94–109)
CO2 SERPL-SCNC: 27 MMOL/L (ref 20–32)
CREAT SERPL-MCNC: 0.97 MG/DL (ref 0.66–1.25)
CRP SERPL-MCNC: 6.4 MG/L (ref 0–8)
DIFFERENTIAL METHOD BLD: ABNORMAL
EOSINOPHIL # BLD AUTO: 0.1 10E9/L (ref 0–0.7)
EOSINOPHIL NFR BLD AUTO: 1 %
ERYTHROCYTE [DISTWIDTH] IN BLOOD BY AUTOMATED COUNT: 12.4 % (ref 10–15)
GFR SERPL CREATININE-BSD FRML MDRD: 82 ML/MIN/1.7M2
GLUCOSE SERPL-MCNC: 98 MG/DL (ref 70–99)
HCT VFR BLD AUTO: 48.4 % (ref 40–53)
HGB BLD-MCNC: 16 G/DL (ref 13.3–17.7)
LACTATE BLD-SCNC: 1.1 MMOL/L (ref 0.7–2)
LYMPHOCYTES # BLD AUTO: 1.9 10E9/L (ref 0.8–5.3)
LYMPHOCYTES NFR BLD AUTO: 15 %
MCH RBC QN AUTO: 28.7 PG (ref 26.5–33)
MCHC RBC AUTO-ENTMCNC: 33.1 G/DL (ref 31.5–36.5)
MCV RBC AUTO: 87 FL (ref 78–100)
MONOCYTES # BLD AUTO: 1.5 10E9/L (ref 0–1.3)
MONOCYTES NFR BLD AUTO: 12 %
NEUTROPHILS # BLD AUTO: 9.2 10E9/L (ref 1.6–8.3)
NEUTROPHILS NFR BLD AUTO: 72 %
PLATELET # BLD AUTO: 323 10E9/L (ref 150–450)
POTASSIUM SERPL-SCNC: 4 MMOL/L (ref 3.4–5.3)
RBC # BLD AUTO: 5.58 10E12/L (ref 4.4–5.9)
SODIUM SERPL-SCNC: 140 MMOL/L (ref 133–144)
TROPONIN I BLD-MCNC: 0 UG/L (ref 0–0.08)
WBC # BLD AUTO: 12.7 10E9/L (ref 4–11)

## 2018-11-21 PROCEDURE — 25000132 ZZH RX MED GY IP 250 OP 250 PS 637: Performed by: EMERGENCY MEDICINE

## 2018-11-21 PROCEDURE — 85025 COMPLETE CBC W/AUTO DIFF WBC: CPT | Performed by: EMERGENCY MEDICINE

## 2018-11-21 PROCEDURE — 29105 APPLICATION LONG ARM SPLINT: CPT | Mod: LT

## 2018-11-21 PROCEDURE — 96374 THER/PROPH/DIAG INJ IV PUSH: CPT

## 2018-11-21 PROCEDURE — 93005 ELECTROCARDIOGRAM TRACING: CPT

## 2018-11-21 PROCEDURE — 25000128 H RX IP 250 OP 636: Performed by: EMERGENCY MEDICINE

## 2018-11-21 PROCEDURE — 80048 BASIC METABOLIC PNL TOTAL CA: CPT | Performed by: EMERGENCY MEDICINE

## 2018-11-21 PROCEDURE — 84484 ASSAY OF TROPONIN QUANT: CPT

## 2018-11-21 PROCEDURE — 96376 TX/PRO/DX INJ SAME DRUG ADON: CPT

## 2018-11-21 PROCEDURE — 73030 X-RAY EXAM OF SHOULDER: CPT | Mod: LT

## 2018-11-21 PROCEDURE — 99285 EMERGENCY DEPT VISIT HI MDM: CPT | Mod: 25

## 2018-11-21 PROCEDURE — 83605 ASSAY OF LACTIC ACID: CPT | Performed by: EMERGENCY MEDICINE

## 2018-11-21 PROCEDURE — 86140 C-REACTIVE PROTEIN: CPT | Performed by: EMERGENCY MEDICINE

## 2018-11-21 RX ORDER — METHOCARBAMOL 750 MG/1
1500 TABLET, FILM COATED ORAL ONCE
Status: COMPLETED | OUTPATIENT
Start: 2018-11-21 | End: 2018-11-21

## 2018-11-21 RX ORDER — HYDROCODONE BITARTRATE AND ACETAMINOPHEN 5; 325 MG/1; MG/1
1 TABLET ORAL EVERY 6 HOURS PRN
Qty: 12 TABLET | Refills: 0 | Status: SHIPPED | OUTPATIENT
Start: 2018-11-21 | End: 2024-07-02

## 2018-11-21 RX ORDER — OXYCODONE HYDROCHLORIDE 5 MG/1
5 TABLET ORAL ONCE
Status: COMPLETED | OUTPATIENT
Start: 2018-11-21 | End: 2018-11-21

## 2018-11-21 RX ORDER — IBUPROFEN 600 MG/1
600 TABLET, FILM COATED ORAL EVERY 6 HOURS
Qty: 30 TABLET | Refills: 0 | Status: SHIPPED | OUTPATIENT
Start: 2018-11-21 | End: 2018-11-24

## 2018-11-21 RX ORDER — HYDROMORPHONE HYDROCHLORIDE 1 MG/ML
.5-1 INJECTION, SOLUTION INTRAMUSCULAR; INTRAVENOUS; SUBCUTANEOUS
Status: DISCONTINUED | OUTPATIENT
Start: 2018-11-21 | End: 2018-11-21 | Stop reason: HOSPADM

## 2018-11-21 RX ADMIN — OXYCODONE HYDROCHLORIDE 5 MG: 5 TABLET ORAL at 19:14

## 2018-11-21 RX ADMIN — Medication 0.5 MG: at 19:14

## 2018-11-21 RX ADMIN — METHOCARBAMOL 1500 MG: 750 TABLET ORAL at 19:38

## 2018-11-21 RX ADMIN — Medication 1 MG: at 17:22

## 2018-11-21 ASSESSMENT — ENCOUNTER SYMPTOMS
DIARRHEA: 1
ARTHRALGIAS: 1
FEVER: 1

## 2018-11-21 NOTE — ED TRIAGE NOTES
"C/O severe L shoulder pain beginning yesterday and worsening today. Denies cardiac hx. EKG WDL.  Hx Joint/bone infection of R shoulder. Pt reports this feels similar.  Pt \"Feels\" febrile at home but did not verify. ROM decreased, tingling, SOB with pain spasms. Pt tensing muscles in arm due to pain. Tachycardia, tachypnea and HTN otherwise ABC in tact. A/OX4  "

## 2018-11-21 NOTE — ED AVS SNAPSHOT
[facility] Emergency Department    [address] [place] [address]    Phone:  [phone]    Fax:  [phone]                                       Zeke Treadwell   MRN: [id number]    Department:  [facility] Emergency Department   Date of Visit:  11/21/2018           Patient Information     Date Of Birth          1970        Your diagnoses for this visit were:     Acute pain of left shoulder        You were seen by Mckay Borjas MD.      Follow-up Information     Follow up with Clinic, [facility] In 2 days.    Contact information:    [address] [facility]  [place] [address]  [phone]          Discharge Instructions         RICE     Rest an injury, elevate it, and use ice and compression as directed.   RICE stands for rest, ice, compression, and elevation. These can limit pain and swelling after an injury. RICE may be recommended to help treat breaks (fractures), sprains, strains, and bruises or bumps.   Home care  Here are the details of RICE:    Rest. Limit the use of the injured body part. This helps prevent further damage to the body part and gives it time to heal. In some cases, you may need a sling, brace, splint, or cast to help keep the body part still until it has healed.    Ice. Applying ice right after an injury helps relieve pain and swelling. To make an ice pack, put ice cubes in a plastic bag that seals at the top. Wrap the bag in a clean, thin towel or cloth. Then place it over the injured area. Do this for 10 to 15 minutes every 3 to 4 hours. Continue for the next 1 to 3 days or until your symptoms improve. Never put ice directly on your skin. Don't ice an area longer than 15 minutes at a time.    Compression. Putting pressure on an injury helps reduce swelling and provides support. Wrap the injured area firmly with an elastic bandage or wrap. Make sure not to wrap the bandage too tightly or you will cut off blood flow to the  injured area. If your bandage loosens, rewrap it.    Elevation. Keeping an injury raised or elevated above the level of your heart reduces swelling, pain, and throbbing. For instance, if you have a broken leg, it may help to rest your leg on several pillows when sitting or lying down. Try to keep the injured area elevated as often as possible.  Follow-up care  Follow up with your healthcare provider, or as advised.  When to seek medical advice  Call your healthcare provider right away if any of these occur:    Fever of 100.4 F (38 C) or higher, or as directed by your healthcare provider    Chills    Increased pain or swelling in the injured body part    Injured body part becomes cold, blue, numb, or tingly    Signs of infection. These include warmth in the skin, redness, drainage, or bad smell coming from the injured body part.  Date Last Reviewed: 6/1/2018 2000-2018 [employer]. [address] [place] [address]. All rights reserved. This information is not intended as a substitute for professional medical care. Always follow your healthcare professional's instructions.          Exercises for Shoulder Flexibility: Pendulum Exercise      Improving your flexibility can reduce pain. Stretching exercises also can help increase your range of pain-free motion. Breathe normally when you exercise. And try to use smooth, fluid movements.  Follow any special instructions you are given. If you feel pain, stop the exercise. If the pain continues after stopping, call your healthcare provider.  Here are the steps for the pendulum exercise:     Lean over with your good arm supported on a table or chair.    Relax the arm on the painful side, letting it hang straight down.    Slowly begin to swing the relaxed arm. Move it in a small [place], gradually making it bigger if you can. Then reverse the direction. Next, move it backward and forward. Finally, move it side to side.     Note: Spend about 5 minutes  doing the exercise, 3 times a day. Change direction after 1 minute of motion.   Date Last Reviewed: 12/1/2017 2000-2018 [employer]. [address] [facility], [place] [address]. All rights reserved. This information is not intended as a substitute for professional medical care. Always follow your healthcare professional's instructions.          24 Hour Appointment Hotline       To make an appointment at any [facility], call [phone] ([phone]). If you don't have a family doctor or clinic, we will help you find one. [facility] are conveniently located to serve the needs of you and your family.             Review of your medicines      START taking        Dose / Directions Last dose taken    HYDROcodone-acetaminophen 5-325 MG tablet   Commonly known as:  NORCO   Dose:  1 tablet   Quantity:  12 tablet        Take 1 tablet by mouth every 6 hours as needed for pain   Refills:  0        ibuprofen 600 MG tablet   Commonly known as:  ADVIL/MOTRIN   Dose:  600 mg   Quantity:  30 tablet        Take 1 tablet (600 mg) by mouth every 6 hours for 3 days Take every 6 hours with food for three days.   Refills:  0                Information about OPIOIDS     PRESCRIPTION OPIOIDS: WHAT YOU NEED TO KNOW   We gave you an opioid (narcotic) pain medicine. It is important to manage your pain, but opioids are not always the best choice. You should first try all the other options your care team gave you. Take this medicine for as short a time (and as few doses) as possible.    Some activities can increase your pain, such as bandage changes or therapy sessions. It may help to take your pain medicine 30 to 60 minutes before these activities. Reduce your stress by getting enough sleep, working on hobbies you enjoy and practicing relaxation or meditation. Talk to your care team about ways to manage your pain beyond prescription opioids.    These medicines have risks:    DO NOT drive when on new or higher  doses of pain medicine. These medicines can affect your alertness and reaction times, and you could be arrested for driving under the influence (DUI). If you need to use opioids long-term, talk to your care team about driving.    DO NOT operate heavy machinery    DO NOT do any other dangerous activities while taking these medicines.    DO NOT drink any alcohol while taking these medicines.     If the opioid prescribed includes acetaminophen, DO NOT take with any other medicines that contain acetaminophen. Read all labels carefully. Look for the word  acetaminophen  or  Tylenol.  Ask your pharmacist if you have questions or are unsure.    You can get addicted to pain medicines, especially if you have a history of addiction (chemical, alcohol or substance dependence). Talk to your care team about ways to reduce this risk.    All opioids tend to cause constipation. Drink plenty of water and eat foods that have a lot of fiber, such as fruits, vegetables, prune juice, apple juice and high-fiber cereal. Take a laxative (Miralax, milk of magnesia, Colace, Senna) if you don t move your bowels at least every other day. Other side effects include upset stomach, sleepiness, dizziness, throwing up, tolerance (needing more of the medicine to have the same effect), physical dependence and slowed breathing.    Store your pills in a secure place, locked if possible. We will not replace any lost or stolen medicine. If you don t finish your medicine, please throw away (dispose) as directed by your pharmacist. The [place] [employer] has more information about safe disposal: https://www.pca.state.[place]/living-green/managing-unwanted-medications        Prescriptions were sent or printed at these locations (2 Prescriptions)                   Other Prescriptions                Printed at Department/Unit printer (2 of 2)         HYDROcodone-acetaminophen (NORCO) 5-325 MG tablet               ibuprofen (ADVIL/MOTRIN) 600 MG  tablet                Procedures and tests performed during your visit     Basic metabolic panel    CBC with platelets differential    CRP inflammation    EKG 12 lead    Lactic acid whole blood    Peripheral IV catheter    Troponin POCT    XR Shoulder Left G/E 3 Views      Orders Needing Specimen Collection     None      Pending Results     Date and Time Order Name Status Description    11/21/2018 1710 EKG 12 lead Preliminary             Pending Culture Results     No orders found from 11/19/2018 to 11/22/2018.            Pending Results Instructions     If you had any lab results that were not finalized at the time of your Discharge, you can call the ED Lab Result RN at [phone]. You will be contacted by this team for any positive Lab results or changes in treatment. The nurses are available 7 days a week from 10A to 6:30P.  You can leave a message 24 hours per day and they will return your call.        Test Results From Your Hospital Stay        11/21/2018  5:51 PM      Component Results     Component Value Ref Range & Units Status    WBC 12.7 (H) 4.0 - 11.0 10e9/L Final    RBC Count 5.58 4.4 - 5.9 10e12/L Final    Hemoglobin 16.0 13.3 - 17.7 g/dL Final    Hematocrit 48.4 40.0 - 53.0 % Final    MCV 87 78 - 100 fl Final    MCH 28.7 26.5 - 33.0 pg Final    MCHC 33.1 31.5 - 36.5 g/dL Final    RDW 12.4 10.0 - 15.0 % Final    Platelet Count 323 150 - 450 10e9/L Final    Diff Method Automated Method  Final    % Neutrophils 72.0 % Final    % Lymphocytes 15.0 % Final    % Monocytes 12.0 % Final    % Eosinophils 1.0 % Final    Absolute Neutrophil 9.2 (H) 1.6 - 8.3 10e9/L Final    Absolute Lymphocytes 1.9 0.8 - 5.3 10e9/L Final    Absolute Monocytes 1.5 (H) 0.0 - 1.3 10e9/L Final    Absolute Eosinophils 0.1 0.0 - 0.7 10e9/L Final         11/21/2018  5:45 PM      Component Results     Component Value Ref Range & Units Status    Sodium 140 133 - 144 mmol/L Final    Potassium 4.0 3.4 - 5.3 mmol/L Final    Chloride 106 94 -  109 mmol/L Final    Carbon Dioxide 27 20 - 32 mmol/L Final    Anion Gap 7 3 - 14 mmol/L Final    Glucose 98 70 - 99 mg/dL Final    Urea Nitrogen 12 7 - 30 mg/dL Final    Creatinine 0.97 0.66 - 1.25 mg/dL Final    GFR Estimate 82 >60 mL/min/1.7m2 Final    GFR Estimate If Black >90 >60 mL/min/1.7m2 Final    Calcium 8.7 8.5 - 10.1 mg/dL Final         11/21/2018  5:31 PM      Component Results     Component Value Ref Range & Units Status    Lactic Acid 1.1 0.7 - 2.0 mmol/L Final         11/21/2018  5:14 PM      Component Results     Component Value Ref Range & Units Status    Troponin I 0.00 0.00 - 0.08 ug/L Final         11/21/2018  6:04 PM      Narrative     LEFT SHOULDER THREE VIEWS  11/21/2018 5:53 PM     HISTORY: Pain, evaluate for avulsion fracture, joint effusion.     COMPARISON: None.        Impression     IMPRESSION:  There is some calcification along the anterior margin of  the humerus likely related to calcific tendinitis. No fracture or  dislocation.    WILLY PEPE MD         11/21/2018  5:54 PM      Component Results     Component Value Ref Range & Units Status    CRP Inflammation 6.4 0.0 - 8.0 mg/L Final                Clinical Quality Measure: Blood Pressure Screening     Your blood pressure was checked while you were in the emergency department today. The last reading we obtained was  BP: (!) 129/97 . Please read the guidelines below about what these numbers mean and what you should do about them.  If your systolic blood pressure (the top number) is less than 120 and your diastolic blood pressure (the bottom number) is less than 80, then your blood pressure is normal. There is nothing more that you need to do about it.  If your systolic blood pressure (the top number) is 120-139 or your diastolic blood pressure (the bottom number) is 80-89, your blood pressure may be higher than it should be. You should have your blood pressure rechecked within a year by a primary care provider.  If your systolic  "blood pressure (the top number) is 140 or greater or your diastolic blood pressure (the bottom number) is 90 or greater, you may have high blood pressure. High blood pressure is treatable, but if left untreated over time it can put you at risk for heart attack, stroke, or kidney failure. You should have your blood pressure rechecked by a primary care provider within the next 4 weeks.  If your provider in the emergency department today gave you specific instructions to follow-up with your doctor or provider even sooner than that, you should follow that instruction and not wait for up to 4 weeks for your follow-up visit.        Thank you for choosing [facility]       Thank you for choosing [facility] for your care. Our goal is always to provide you with excellent care. Hearing back from our patients is one way we can continue to improve our services. Please take a few minutes to complete the written survey that you may receive in the mail after you visit with us. Thank you!        [email or web address] Information     [employer] lets you send messages to your doctor, view your test results, renew your prescriptions, schedule appointments and more. To sign up, go to [email or web address] . Click on \"Log in\" on the left side of the screen, which will take you to the Welcome page. Then click on \"Sign up Now\" on the right side of the page.     You will be asked to enter the access code listed below, as well as some personal information. Please follow the directions to create your username and password.     Your access code is: [email or web address]  Expires: 2019  8:02 PM     Your access code will  in 90 days. If you need help or a new code, please call your [facility] clinic or [phone].        Care EveryWhere ID     This is your Care EveryWhere ID. This could be used by other organizations to access your [facility] medical records  [id number]        Equal Access to Services     BOB JOHNSON : wayne Gregory " gracie campbell waxay idiin hayaan adeeg kharash la'aan ah. So [facility] [phone].    ATENCIÓN: Si habla melvina, tiene a meng disposición servicios gratuitos de asistencia lingüística. Llame al [phone].    We comply with applicable federal civil rights laws and [place] laws. We do not discriminate on the basis of race, color, national origin, age, disability, sex, sexual orientation, or gender identity.            After Visit Summary       This is your record. Keep this with you and show to your community pharmacist(s) and doctor(s) at your next visit.

## 2018-11-21 NOTE — ED AVS SNAPSHOT
[facility] Emergency Department    [address]    [facility] [address]    Phone:  [phone]    Fax:  [phone]                                       Zeke Treadwell   MRN: [id number]    Department:  [facility] Emergency Department   Date of Visit:  11/21/2018           After Visit Summary Signature Page     I have received my discharge instructions, and my questions have been answered. I have discussed any challenges I see with this plan with the nurse or doctor.    ..........................................................................................................................................  Patient/Patient Representative Signature      ..........................................................................................................................................  Patient Representative Print Name and Relationship to Patient    ..................................................               ................................................  Date                                   Time    ..........................................................................................................................................  Reviewed by Signature/Title    ...................................................              ..............................................  Date                                               Time          22EPIC Rev 08/18

## 2018-11-21 NOTE — ED PROVIDER NOTES
History     Chief Complaint:  Shoulder Pain    HPI   Zeke Treadwell is a 48 year old male with history of right sided tendonitis of the shoulder who presents for evaluation of left shoulder pain. Denies injury. Yesterday he first developed pain to the left shoulder. His symptoms today are exactly like his pain in the past involving a bone infection to the right shoulder, but are worse this time in the opposite shoulder. Previous infection improved with time. He has had a low grade fever at home, and also had an episode of diarrhea. He has taken Advil and Aleve about 5 hours ago.     Allergies:  No Known Drug Allergies      Medications:    The patient is not currently taking any prescribed medications.     Past Medical History:    Bone infection, shoulder region right    Past Surgical History:    Appendectomy    Family History:    No past pertinent family history.     Social History:  Relationship status:   Tobacco use: No  Alcohol use: No  The patient presents with his wife.    Marital Status:   [2]     Review of Systems   Constitutional: Positive for fever.   Gastrointestinal: Positive for diarrhea.   Musculoskeletal: Positive for arthralgias (left shoulder).   All other systems reviewed and are negative.    Physical Exam     Patient Vitals for the past 24 hrs:   BP Temp Temp src Heart Rate Resp SpO2   11/21/18 1930 - - - - - 94 %   11/21/18 1915 (!) 129/97 - - - - -   11/21/18 1815 (!) 139/101 - - 80 14 98 %   11/21/18 1730 - - - 88 - 95 %   11/21/18 1715 (!) 160/111 - - 96 - 99 %   11/21/18 1712 (!) 141/109 - - 90 14 99 %   11/21/18 1709 (!) 152/112 - - 92 14 99 %   11/21/18 1706 (!) 144/110 - - 94 14 100 %   11/21/18 1705 (!) 171/124 99.6  F (37.6  C) Oral 105 28 99 %   11/21/18 1703 (!) 162/123 - - 98 10 99 %   11/21/18 1700 (!) 167/119 - - 104 19 -   11/21/18 1658 - - - - (!) 31 99 %   11/21/18 1656 (!) 171/124 - - - - -        Physical Exam  General: Patient is alert and interactive  when I enter the room  Head:  The scalp, face, and head appear normal  Eyes:  Conjunctivae are normal  ENT:    The nose is normal    Pinnae are normal    External acoustic canals are normal  Neck:  Trachea midline  CV:  Pulses are normal .   Resp:  No respiratory distress   Skin:  No rash or lesions noted  Neuro:  Speech is normal and fluent. Face is symmetric.     Moving all extremities well.   Psych: Awake. Alert.  Normal affect.  Appropriate interactions  Musc:   (L) hand Exam:  Cap refil: < 2 seconds  Sensation: Intact to light touch  Radial pulse normal  Ulnar pulse  normal  (L) wrist: PROM/AROM/Strength WNL  (L) elbow: PROM/AROM/Strength WNL  (L Shld: Painful IR/ER. No swelling over shoulder. No redness. No joint effusion.   .            Emergency Department Course     ECG:  Indication: Rule out cardiac   Completed at 1656.  Read at 1657.  Normal sinus rhythm  Normal ECG   Rate 99 bpm. NJ interval 154. QRS duration 86. QT/QTc 314/402. P-R-T axes 62  -4  18.     Imaging:  Radiology findings were communicated with the patient who voiced understanding of the findings.    XR Shoulder Left G/E 3 Views  IMPRESSION:  There is some calcification along the anterior margin of  the humerus likely related to calcific tendinitis. No fracture or  dislocation.  Reading per radiology.     Laboratory:  Laboratory findings were communicated with the patient and family who voiced understanding of the findings.  CBC: WBC: 12.7(H), Abs neutrophil: 9.2(H), Abs monocytes: 1.5(H) o/w WNL (HGB 16.0, )  BMP: AWNL (Creatinine 0.97)   Lactic Acid (Collected at 1711): 1.1   Troponin POCT (Collected 1702): 0.00   CRP inflammation: 6.4    Procedures:  PROCEDURE: Immobilizer Placement.    A shoulder immobilizer was applied to the left upper extremity and after placement I checked and adjusted the fit to ensure proper positioning.  The patient was more comfortable with the immobilizer in place.  Sensation and circulation are intact  after immobilizer placement.     Interventions:   Dilaudid 1 mg IV   Dilaudid 0.5 mg IV   Roxicodone 5 mg Oral   Robaxin 1,5000 mg Oral     Emergency Department Course:  Nursing notes and vitals reviewed.  EKG obtained in the ED, see results above.   IV was inserted and blood was drawn for laboratory testing, results above.    The patient was sent for a XR while in the emergency department, results above.       I performed an exam of the patient as documented above.    I rechecked the patient, he is doing much better.     Findings and plan explained to the patient. Patient discharged home with instructions regarding supportive care, medications, and reasons to return. The importance of close follow-up was reviewed.      I personally reviewed the laboratory results with the patient and answered all related questions prior to discharge.    Impression & Plan      Medical Decision Makin yo w/ shoulder pain.  Doubt anything worrisome like septic shld, fracture, tumor, etc.   Hx of similar on opposite.   Possible calcific tendonitis.   See ortho in f/u.     Diagnosis:    ICD-10-CM    1. Acute pain of left shoulder M25.512         Disposition:   Discharged to home      Discharge Medications:  New Prescriptions    HYDROCODONE-ACETAMINOPHEN (NORCO) 5-325 MG TABLET    Take 1 tablet by mouth every 6 hours as needed for pain    IBUPROFEN (ADVIL/MOTRIN) 600 MG TABLET    Take 1 tablet (600 mg) by mouth every 6 hours for 3 days Take every 6 hours with food for three days.       Scribe Disclosure:  I, Ya Oreilly, am serving as a scribe at 5:10 PM on 2018 to document services personally performed by Mckay Borjas MD, based on my observations and the provider's statements to me.     Ya Oreilly  2018   [facility] EMERGENCY DEPARTMENT       Mckay Borjas MD  18       Mckay Borjas MD  18

## 2018-11-22 NOTE — DISCHARGE INSTRUCTIONS
RICE     Rest an injury, elevate it, and use ice and compression as directed.   RICE stands for rest, ice, compression, and elevation. These can limit pain and swelling after an injury. RICE may be recommended to help treat breaks (fractures), sprains, strains, and bruises or bumps.   Home care  Here are the details of RICE:    Rest. Limit the use of the injured body part. This helps prevent further damage to the body part and gives it time to heal. In some cases, you may need a sling, brace, splint, or cast to help keep the body part still until it has healed.    Ice. Applying ice right after an injury helps relieve pain and swelling. To make an ice pack, put ice cubes in a plastic bag that seals at the top. Wrap the bag in a clean, thin towel or cloth. Then place it over the injured area. Do this for 10 to 15 minutes every 3 to 4 hours. Continue for the next 1 to 3 days or until your symptoms improve. Never put ice directly on your skin. Don't ice an area longer than 15 minutes at a time.    Compression. Putting pressure on an injury helps reduce swelling and provides support. Wrap the injured area firmly with an elastic bandage or wrap. Make sure not to wrap the bandage too tightly or you will cut off blood flow to the injured area. If your bandage loosens, rewrap it.    Elevation. Keeping an injury raised or elevated above the level of your heart reduces swelling, pain, and throbbing. For instance, if you have a broken leg, it may help to rest your leg on several pillows when sitting or lying down. Try to keep the injured area elevated as often as possible.  Follow-up care  Follow up with your healthcare provider, or as advised.  When to seek medical advice  Call your healthcare provider right away if any of these occur:    Fever of 100.4 F (38 C) or higher, or as directed by your healthcare provider    Chills    Increased pain or swelling in the injured body part    Injured body part becomes cold, blue, numb,  or tingly    Signs of infection. These include warmth in the skin, redness, drainage, or bad smell coming from the injured body part.  Date Last Reviewed: 6/1/2018 2000-2018 [employer]. [address] [place] [id number]. All rights reserved. This information is not intended as a substitute for professional medical care. Always follow your healthcare professional's instructions.          Exercises for Shoulder Flexibility: Pendulum Exercise      Improving your flexibility can reduce pain. Stretching exercises also can help increase your range of pain-free motion. Breathe normally when you exercise. And try to use smooth, fluid movements.  Follow any special instructions you are given. If you feel pain, stop the exercise. If the pain continues after stopping, call your healthcare provider.  Here are the steps for the pendulum exercise:     Lean over with your good arm supported on a table or chair.    Relax the arm on the painful side, letting it hang straight down.    Slowly begin to swing the relaxed arm. Move it in a small [place], gradually making it bigger if you can. Then reverse the direction. Next, move it backward and forward. Finally, move it side to side.     Note: Spend about 5 minutes doing the exercise, 3 times a day. Change direction after 1 minute of motion.   Date Last Reviewed: 12/1/2017 2000-2018 [employer]. [address] [place] [id number]. All rights reserved. This information is not intended as a substitute for professional medical care. Always follow your healthcare professional's instructions.

## 2018-11-23 LAB — INTERPRETATION ECG - MUSE: NORMAL

## 2024-07-02 ENCOUNTER — OFFICE VISIT (OUTPATIENT)
Dept: PEDIATRICS | Facility: CLINIC | Age: 54
End: 2024-07-02
Payer: COMMERCIAL

## 2024-07-02 VITALS
WEIGHT: 208.2 LBS | RESPIRATION RATE: 16 BRPM | SYSTOLIC BLOOD PRESSURE: 124 MMHG | HEART RATE: 84 BPM | OXYGEN SATURATION: 98 % | TEMPERATURE: 97.3 F | HEIGHT: 66 IN | BODY MASS INDEX: 33.46 KG/M2 | DIASTOLIC BLOOD PRESSURE: 77 MMHG

## 2024-07-02 DIAGNOSIS — I10 BENIGN ESSENTIAL HYPERTENSION: ICD-10-CM

## 2024-07-02 DIAGNOSIS — E78.5 HYPERLIPIDEMIA LDL GOAL <70: ICD-10-CM

## 2024-07-02 DIAGNOSIS — M54.50 LUMBAR PAIN: Primary | ICD-10-CM

## 2024-07-02 PROCEDURE — 99204 OFFICE O/P NEW MOD 45 MIN: CPT | Performed by: PHYSICIAN ASSISTANT

## 2024-07-02 RX ORDER — METFORMIN HCL 500 MG
2 TABLET, EXTENDED RELEASE 24 HR ORAL DAILY
COMMUNITY
Start: 2024-02-18 | End: 2024-08-23

## 2024-07-02 RX ORDER — CYCLOBENZAPRINE HCL 10 MG
10 TABLET ORAL 3 TIMES DAILY PRN
Qty: 15 TABLET | Refills: 0 | Status: SHIPPED | OUTPATIENT
Start: 2024-07-02

## 2024-07-02 RX ORDER — LISINOPRIL/HYDROCHLOROTHIAZIDE 10-12.5 MG
1 TABLET ORAL DAILY
COMMUNITY
Start: 2024-04-11 | End: 2024-07-02

## 2024-07-02 RX ORDER — METHYLPREDNISOLONE 4 MG
TABLET, DOSE PACK ORAL
Qty: 21 TABLET | Refills: 0 | Status: SHIPPED | OUTPATIENT
Start: 2024-07-02

## 2024-07-02 RX ORDER — LISINOPRIL/HYDROCHLOROTHIAZIDE 10-12.5 MG
1 TABLET ORAL DAILY
Qty: 30 TABLET | Refills: 0 | Status: SHIPPED | OUTPATIENT
Start: 2024-07-02 | End: 2024-08-21

## 2024-07-02 RX ORDER — ATORVASTATIN CALCIUM 20 MG/1
1 TABLET, FILM COATED ORAL AT BEDTIME
COMMUNITY
Start: 2024-02-29 | End: 2024-07-02

## 2024-07-02 RX ORDER — ATORVASTATIN CALCIUM 20 MG/1
20 TABLET, FILM COATED ORAL AT BEDTIME
Qty: 30 TABLET | Refills: 0 | Status: SHIPPED | OUTPATIENT
Start: 2024-07-02

## 2024-07-02 NOTE — CONFIDENTIAL NOTE
NEUROSURGERY - NEW PREVISIT PLANNING    Referring Provider: Magnolia Brandt PA-C      OVN 07/02/24   Reason For Visit: M54.50 (ICD-10-CM) - Lumbar pain        IMAGING STATUS/LOCATION DATE/TYPE   MRI N/A    CT N/A    XRAY PACS 01/28/22, Lumbar, [facility]   NOTES STATUS/LOCATION DATE/TYPE   Other Spine OVN: Encounters (ED note)    Media ([facility]) 05/03/17 02/08/22   EMG N/A    INJECTION N/A    PHYSICAL THERAPY Care Everywhere 2022   SURGERY N/A

## 2024-07-02 NOTE — CONFIDENTIAL NOTE
Action P#[phone] [facility], 07/02 AS   Action Taken Requested 01/28/22 lumbar XR to be pushed to PACS   Status: done

## 2024-07-02 NOTE — PROGRESS NOTES
Assessment & Plan     Lumbar pain  Begin medrol dose heide, flexeril, heat/ice.   - Spine  Referral; Future  - methylPREDNISolone (MEDROL DOSEPAK) 4 MG tablet therapy pack; Follow Package Directions  - cyclobenzaprine (FLEXERIL) 10 MG tablet; Take 1 tablet (10 mg) by mouth 3 times daily as needed for muscle spasms    Hyperlipidemia LDL goal <70  Refills given.  - atorvastatin (LIPITOR) 20 MG tablet; Take 1 tablet (20 mg) by mouth at bedtime    Benign essential hypertension  Refills given.  - lisinopril-hydrochlorothiazide (ZESTORETIC) 10-12.5 MG tablet; Take 1 tablet by mouth daily    Appointment scheduled to establish care.     Subjective   Zeke is a 54 year old, presenting for the following health issues:  Patient switching care to [facility].   Pain History:  When did you first notice your pain? About 1 year ago    Two months ago, symptoms worsened  No trauma or injury  Have you seen anyone else for your pain? Yes - about 1 year ago   How has your pain affected your ability to work? Can work part time with limitations   What type of work do you or did you do?  [employer]   Where in your body do you have pain? Back Pain  Onset/Duration: about 1 year   Description:   Location of pain: low back midline  Character of pain: sharp, becoming constant, and intermittent  Pain radiation: radiates into the right leg and radiates into the left leg  New numbness or weakness in legs, not attributed to pain: YES- weakness  Intensity: At its worst  9-10/10  Progression of Symptoms: worsening  History:   Specific cause: lifting- has physical job requiring to lift heavy objects 40-60 pounds and makes symptoms worse  Pain interferes with job: YES  History of back problems: recurrent self limited episodes of low back pain in the past  Any previous MRI or X-rays: Yes--at [facility].  Date 01/28/2022 ; 5 and 7 lumbar  Patient has not had MRI due to claustrophobia  Sees a specialist for back pain: No  Alleviating factors:  "  Improved by: none    Precipitating factors:  Worsened by: Lifting, Bending, Standing, and Sitting  Therapies tried and outcome: Physical Therapy, exercises did not help, advil with no relief    Patient needs refills of meds. Needs to establish care.     Review of Systems  Constitutional, HEENT, cardiovascular, pulmonary, gi and gu systems are negative, except as otherwise noted.      Objective    /77 (BP Location: Right arm, Patient Position: Sitting, Cuff Size: Adult Large)   Pulse 84   Temp 97.3  F (36.3  C) (Temporal)   Resp 16   Ht 1.664 m (5' 5.5\")   Wt 94.4 kg (208 lb 3.2 oz)   SpO2 98%   BMI 34.12 kg/m    Body mass index is 34.12 kg/m .  Physical Exam   GENERAL: alert and no distress  ORTHO: Lumber/Thoracic Spine Exam: Tender:  lumbar spinous processes, right para lumbar muscles  Range of Motion:  lumbar flexion  decreased, painful, left lateral lumbar bending  full, right lateral lumbar bending  decreased, painful  Strength:  normal  No results found for any visits on 07/02/24.        Signed Electronically by: Magnolia Brandt PA-C    "

## 2024-07-09 ENCOUNTER — PRE VISIT (OUTPATIENT)
Dept: NEUROSURGERY | Facility: CLINIC | Age: 54
End: 2024-07-09

## 2024-07-09 ENCOUNTER — OFFICE VISIT (OUTPATIENT)
Dept: NEUROSURGERY | Facility: CLINIC | Age: 54
End: 2024-07-09
Attending: PHYSICIAN ASSISTANT
Payer: COMMERCIAL

## 2024-07-09 VITALS
OXYGEN SATURATION: 100 % | HEART RATE: 86 BPM | WEIGHT: 207.5 LBS | HEIGHT: 66 IN | BODY MASS INDEX: 33.35 KG/M2 | SYSTOLIC BLOOD PRESSURE: 126 MMHG | DIASTOLIC BLOOD PRESSURE: 85 MMHG

## 2024-07-09 DIAGNOSIS — M47.812 CERVICAL SPONDYLOSIS: ICD-10-CM

## 2024-07-09 DIAGNOSIS — M43.16 SPONDYLOLISTHESIS OF LUMBAR REGION: Primary | ICD-10-CM

## 2024-07-09 PROCEDURE — 99203 OFFICE O/P NEW LOW 30 MIN: CPT

## 2024-07-09 ASSESSMENT — PAIN SCALES - GENERAL: PAINLEVEL: SEVERE PAIN (6)

## 2024-07-09 NOTE — LETTER
"7/9/2024      Zeke Treadwell  [address]  [place] [address]      Dear Colleague,    Thank you for referring your patient, Zeke Treadwell, to the [facility] NEUROLOGY [facility]. Please see a copy of my visit note below.    [facility] Neurosurgery Clinic Visit      HPI: Zeke Treadwell is a 54 year old male who presents for evaluation of low back pain. Symptoms started approximately 1 year ago.  He states symptoms have progressively gotten worse.  At sometimes his back pain is so severe that he feels that he \"cannot breathe.\"  He also feels that he has no strength in his legs when he is getting to stand up. He denies any lower extremity radicular symptoms, bowel or bladder dysfunction.  He has not had previous spine surgery.  He was recently prescribed a Medrol Dosepak by his primary care provider which did not help.  He is also on Flexeril.  He has done physical therapy in the past without significant improvement.  He does do a labor-intensive job which exacerbates symptoms.  He has tried to have MRI scans done in the past but due to claustrophobia he has not been able to complete these.    Patient also has increasing neck and bilateral shoulder pain.  He occasionally feels that he drops things with his hands but not all the time.  Again he states his balance is okay but feels some weakness in his legs when he goes to stand up.  No previous cervical spine surgery.      Past Medical History:   Diagnosis Date     Bone infection, shoulder region (H)     right       Past Medical History reviewed with patient during visit.    Past Surgical History:   Procedure Laterality Date     APPENDECTOMY       Past Surgical History reviewed with patient during visit.    Current Outpatient Medications   Medication Sig Dispense Refill     atorvastatin (LIPITOR) 20 MG tablet Take 1 tablet (20 mg) by mouth at bedtime 30 tablet 0     cyclobenzaprine (FLEXERIL) 10 MG tablet Take 1 tablet " "(10 mg) by mouth 3 times daily as needed for muscle spasms 15 tablet 0     lisinopril-hydrochlorothiazide (ZESTORETIC) 10-12.5 MG tablet Take 1 tablet by mouth daily 30 tablet 0     metFORMIN (GLUCOPHAGE XR) 500 MG 24 hr tablet Take 2 tablets by mouth daily       methylPREDNISolone (MEDROL DOSEPAK) 4 MG tablet therapy pack Follow Package Directions (Patient not taking: Reported on 7/9/2024) 21 tablet 0     No current facility-administered medications for this visit.       No Known Allergies    Social History     Socioeconomic History     Marital status:      Spouse name: None     Number of children: None     Years of education: None     Highest education level: None   Tobacco Use     Smoking status: Never     Passive exposure: Never     Smokeless tobacco: Never   Vaping Use     Vaping status: Never Used   Substance and Sexual Activity     Alcohol use: No     Drug use: No     Social Determinants of Health      Received from [facility]    Financial Resource Strain    Received from [facility]    Social Connections   Interpersonal Safety: Low Risk  (7/2/2024)    Interpersonal Safety      Do you feel physically and emotionally safe where you currently live?: Yes      Within the past 12 months, have you been hit, slapped, kicked or otherwise physically hurt by someone?: No      Within the past 12 months, have you been humiliated or emotionally abused in other ways by your partner or ex-partner?: No       No family history on file.    ROS: 10 point ROS neg other than the symptoms noted above in the HPI.    Vital Signs: /85   Pulse 86   Ht 5' 5.5\" (1.664 m)   Wt 207 lb 8 oz (94.1 kg)   SpO2 100%   BMI 34.00 kg/m      Neurological Examination:  Awake  Alert  Oriented x 3  Speech clear    Motor exam:  Shoulder Abduction  Right:  4/5   Left:  4/5 - pain limiting  Biceps                      Right:  5/5   Left:  5/5  Triceps                    "  Right:  5/5   Left:  5/5  Wrist Extensors        Right:  5/5   Left:  5/5  Wrist Flexors            Right:  5/5   Left:  5/5  Intrinsics                   Right:  5/5   Left:  5/5    Iliopsoas  (hip flexion)               Right: 4/5  Left:  4/5 - pain limiting  Quadriceps  (knee extension)       Right:  5/5  Left:  5/5  Hamstrings  (knee flexion)            Right:  5/5  Left:  5/5  Gastroc Soleus  (PF)                          Right:  5/5  Left:  5/5  Tibialis Ant  (DF)                          Right:  5/5  Left:  5/5  EHL                          Right:  5/5  Left:  5/5         Sensation normal to light touch    Reflexes are 2+ in the patellar and Achilles. There is no clonus.     Negative Taran sign bilaterally.    Musculoskeletal:  Gait: Able to stand from a seated position. Normal non-antalgic, non-myelopathic gait. Able to heel/toe walk without loss of balance.    Lumbar and cervical spine TTP    Imaging:   Lumbar spine x-rays reviewed from 1/28/2022  IMPRESSION: 5 nonrib-bearing lumbar type vertebral bodies. Lumbar spine lordosis is maintained. Bilateral L5 pars defects with grade 1 L5 on S1 anterolisthesis measuring 2 mm. Grade 1 retrolisthesis of L4 on L5. Lumbar vertebral body heights are maintained. Mild loss of disc space height at L5-S1.     Cervical spine x-ray report reviewed from 1/19/2021IMPRESSION:   Moderate cervical spondylosis at the C5-6 level.     Assessment/Plan:   Zeke Treadwell is a 54 year old male who presents for evaluation of low back pain as well as neck and bilateral shoulder discomfort.  Patient states he feels that his legs are weak when he goes to stand up.  On exam he has pain limiting weakness with hip flexion and shoulder abduction.  He has tried medications and physical therapy without significant improvement in symptoms.  He has imaging evidence of bilateral pars defect with grade 1 L5 on S1 anterolisthesis as well as grade 1 retrolisthesis of L4 on L5; cervical  spondylosis.  At this time I would recommend patient undergo cervical and lumbar spine MRIs.  He states he has claustrophobia and even with oral medications he cannot tolerate.  Thus I will refer him for an upright MRI scan.  I told the patient to call our office once he has a date set for this so we can find a time to review the scans with the patient.    Advised patient to call our clinic with any questions or concerns. Discussed red flag symptoms and advised to seek medical attention if these develop. Patient voiced understanding and agreement.      Claire Garcia PA-C  [facility] [place] [address]        Again, thank you for allowing me to participate in the care of your patient.        Sincerely,        CLAIRE GARCIA PA-C

## 2024-07-09 NOTE — PROGRESS NOTES
"[facility] Neurosurgery Clinic Visit      HPI: Zeke Treadwell is a 54 year old male who presents for evaluation of low back pain. Symptoms started approximately 1 year ago.  He states symptoms have progressively gotten worse.  At sometimes his back pain is so severe that he feels that he \"cannot breathe.\"  He also feels that he has no strength in his legs when he is getting to stand up. He denies any lower extremity radicular symptoms, bowel or bladder dysfunction.  He has not had previous spine surgery.  He was recently prescribed a Medrol Dosepak by his primary care provider which did not help.  He is also on Flexeril.  He has done physical therapy in the past without significant improvement.  He does do a labor-intensive job which exacerbates symptoms.  He has tried to have MRI scans done in the past but due to claustrophobia he has not been able to complete these.    Patient also has increasing neck and bilateral shoulder pain.  He occasionally feels that he drops things with his hands but not all the time.  Again he states his balance is okay but feels some weakness in his legs when he goes to stand up.  No previous cervical spine surgery.      Past Medical History:   Diagnosis Date    Bone infection, shoulder region (H)     right       Past Medical History reviewed with patient during visit.    Past Surgical History:   Procedure Laterality Date    APPENDECTOMY       Past Surgical History reviewed with patient during visit.    Current Outpatient Medications   Medication Sig Dispense Refill    atorvastatin (LIPITOR) 20 MG tablet Take 1 tablet (20 mg) by mouth at bedtime 30 tablet 0    cyclobenzaprine (FLEXERIL) 10 MG tablet Take 1 tablet (10 mg) by mouth 3 times daily as needed for muscle spasms 15 tablet 0    lisinopril-hydrochlorothiazide (ZESTORETIC) 10-12.5 MG tablet Take 1 tablet by mouth daily 30 tablet 0    metFORMIN (GLUCOPHAGE XR) 500 MG 24 hr tablet Take 2 tablets by mouth daily      " "methylPREDNISolone (MEDROL DOSEPAK) 4 MG tablet therapy pack Follow Package Directions (Patient not taking: Reported on 7/9/2024) 21 tablet 0     No current facility-administered medications for this visit.       No Known Allergies    Social History     Socioeconomic History    Marital status:      Spouse name: None    Number of children: None    Years of education: None    Highest education level: None   Tobacco Use    Smoking status: Never     Passive exposure: Never    Smokeless tobacco: Never   Vaping Use    Vaping status: Never Used   Substance and Sexual Activity    Alcohol use: No    Drug use: No     Social Determinants of Health      [facility]    Financial Resource Strain    [facility]    Social Connections   Interpersonal Safety: Low Risk  (7/2/2024)    Interpersonal Safety     Do you feel physically and emotionally safe where you currently live?: Yes     Within the past 12 months, have you been hit, slapped, kicked or otherwise physically hurt by someone?: No     Within the past 12 months, have you been humiliated or emotionally abused in other ways by your partner or ex-partner?: No       No family history on file.    ROS: 10 point ROS neg other than the symptoms noted above in the HPI.    Vital Signs: /85   Pulse 86   Ht 5' 5.5\" (1.664 m)   Wt 207 lb 8 oz (94.1 kg)   SpO2 100%   BMI 34.00 kg/m      Neurological Examination:  Awake  Alert  Oriented x 3  Speech clear    Motor exam:  Shoulder Abduction  Right:  4/5   Left:  4/5 - pain limiting  Biceps                      Right:  5/5   Left:  5/5  Triceps                     Right:  5/5   Left:  5/5  Wrist Extensors        Right:  5/5   Left:  5/5  Wrist Flexors            Right:  5/5   Left:  5/5  Intrinsics                   Right:  5/5   Left:  5/5    Iliopsoas  (hip flexion)               Right: 4/5  Left:  4/5 - pain " limiting  Quadriceps  (knee extension)       Right:  5/5  Left:  5/5  Hamstrings  (knee flexion)            Right:  5/5  Left:  5/5  Gastroc Soleus  (PF)                          Right:  5/5  Left:  5/5  Tibialis Ant  (DF)                          Right:  5/5  Left:  5/5  EHL                          Right:  5/5  Left:  5/5         Sensation normal to light touch    Reflexes are 2+ in the patellar and Achilles. There is no clonus.     Negative Taran sign bilaterally.    Musculoskeletal:  Gait: Able to stand from a seated position. Normal non-antalgic, non-myelopathic gait. Able to heel/toe walk without loss of balance.    Lumbar and cervical spine TTP    Imaging:   Lumbar spine x-rays reviewed from 1/28/2022  IMPRESSION: 5 nonrib-bearing lumbar type vertebral bodies. Lumbar spine lordosis is maintained. Bilateral L5 pars defects with grade 1 L5 on S1 anterolisthesis measuring 2 mm. Grade 1 retrolisthesis of L4 on L5. Lumbar vertebral body heights are maintained. Mild loss of disc space height at L5-S1.     Cervical spine x-ray report reviewed from 1/19/2021IMPRESSION:   Moderate cervical spondylosis at the C5-6 level.     Assessment/Plan:   Zeke Treadwell is a 54 year old male who presents for evaluation of low back pain as well as neck and bilateral shoulder discomfort.  Patient states he feels that his legs are weak when he goes to stand up.  On exam he has pain limiting weakness with hip flexion and shoulder abduction.  He has tried medications and physical therapy without significant improvement in symptoms.  He has imaging evidence of bilateral pars defect with grade 1 L5 on S1 anterolisthesis as well as grade 1 retrolisthesis of L4 on L5; cervical spondylosis.  At this time I would recommend patient undergo cervical and lumbar spine MRIs.  He states he has claustrophobia and even with oral medications he cannot tolerate.  Thus I will refer him for an upright MRI scan.  I told the patient to call our  office once he has a date set for this so we can find a time to review the scans with the patient.    Advised patient to call our clinic with any questions or concerns. Discussed red flag symptoms and advised to seek medical attention if these develop. Patient voiced understanding and agreement.      Claire Garcia PA-C  [facility] Neurosurgery  [address] [id number]

## 2024-07-10 ENCOUNTER — OFFICE VISIT (OUTPATIENT)
Dept: PEDIATRICS | Facility: CLINIC | Age: 54
End: 2024-07-10
Payer: COMMERCIAL

## 2024-07-10 VITALS
RESPIRATION RATE: 16 BRPM | TEMPERATURE: 97.6 F | DIASTOLIC BLOOD PRESSURE: 81 MMHG | SYSTOLIC BLOOD PRESSURE: 116 MMHG | HEIGHT: 66 IN | WEIGHT: 204.5 LBS | BODY MASS INDEX: 32.87 KG/M2 | OXYGEN SATURATION: 97 % | HEART RATE: 96 BPM

## 2024-07-10 DIAGNOSIS — L30.1 DYSHIDROTIC ECZEMA: ICD-10-CM

## 2024-07-10 DIAGNOSIS — Z11.4 SCREENING FOR HIV (HUMAN IMMUNODEFICIENCY VIRUS): ICD-10-CM

## 2024-07-10 DIAGNOSIS — Z00.00 ROUTINE GENERAL MEDICAL EXAMINATION AT A HEALTH CARE FACILITY: Primary | ICD-10-CM

## 2024-07-10 DIAGNOSIS — E78.5 HYPERLIPIDEMIA LDL GOAL <70: ICD-10-CM

## 2024-07-10 DIAGNOSIS — Z11.59 NEED FOR HEPATITIS C SCREENING TEST: ICD-10-CM

## 2024-07-10 PROBLEM — M54.9 BACK PAIN WITHOUT RADIATION: Status: ACTIVE | Noted: 2022-06-02

## 2024-07-10 PROBLEM — R73.03 PREDIABETES: Status: ACTIVE | Noted: 2019-08-27

## 2024-07-10 PROBLEM — I10 ESSENTIAL HYPERTENSION: Status: ACTIVE | Noted: 2018-07-31

## 2024-07-10 LAB — HBA1C MFR BLD: 5.7 % (ref 0–5.6)

## 2024-07-10 PROCEDURE — 87389 HIV-1 AG W/HIV-1&-2 AB AG IA: CPT | Performed by: INTERNAL MEDICINE

## 2024-07-10 PROCEDURE — 86803 HEPATITIS C AB TEST: CPT | Performed by: INTERNAL MEDICINE

## 2024-07-10 PROCEDURE — 90471 IMMUNIZATION ADMIN: CPT | Performed by: INTERNAL MEDICINE

## 2024-07-10 PROCEDURE — 90480 ADMN SARSCOV2 VAC 1/ONLY CMP: CPT | Performed by: INTERNAL MEDICINE

## 2024-07-10 PROCEDURE — 91320 SARSCV2 VAC 30MCG TRS-SUC IM: CPT | Performed by: INTERNAL MEDICINE

## 2024-07-10 PROCEDURE — 99396 PREV VISIT EST AGE 40-64: CPT | Mod: 25 | Performed by: INTERNAL MEDICINE

## 2024-07-10 PROCEDURE — 80061 LIPID PANEL: CPT | Performed by: INTERNAL MEDICINE

## 2024-07-10 PROCEDURE — 99213 OFFICE O/P EST LOW 20 MIN: CPT | Mod: 25 | Performed by: INTERNAL MEDICINE

## 2024-07-10 PROCEDURE — 80053 COMPREHEN METABOLIC PANEL: CPT | Performed by: INTERNAL MEDICINE

## 2024-07-10 PROCEDURE — 36415 COLL VENOUS BLD VENIPUNCTURE: CPT | Performed by: INTERNAL MEDICINE

## 2024-07-10 PROCEDURE — 83036 HEMOGLOBIN GLYCOSYLATED A1C: CPT | Performed by: INTERNAL MEDICINE

## 2024-07-10 PROCEDURE — 90750 HZV VACC RECOMBINANT IM: CPT | Performed by: INTERNAL MEDICINE

## 2024-07-10 RX ORDER — TRIAMCINOLONE ACETONIDE 1 MG/G
OINTMENT TOPICAL 2 TIMES DAILY
Qty: 30 G | Refills: 1 | Status: SHIPPED | OUTPATIENT
Start: 2024-07-10

## 2024-07-10 SDOH — HEALTH STABILITY: PHYSICAL HEALTH: ON AVERAGE, HOW MANY MINUTES DO YOU ENGAGE IN EXERCISE AT THIS LEVEL?: 30 MIN

## 2024-07-10 SDOH — HEALTH STABILITY: PHYSICAL HEALTH: ON AVERAGE, HOW MANY DAYS PER WEEK DO YOU ENGAGE IN MODERATE TO STRENUOUS EXERCISE (LIKE A BRISK WALK)?: 4 DAYS

## 2024-07-10 ASSESSMENT — PAIN SCALES - GENERAL: PAINLEVEL: NO PAIN (0)

## 2024-07-10 ASSESSMENT — SOCIAL DETERMINANTS OF HEALTH (SDOH): HOW OFTEN DO YOU GET TOGETHER WITH FRIENDS OR RELATIVES?: ONCE A WEEK

## 2024-07-10 NOTE — PROGRESS NOTES
"Preventive Care Visit  [facility], Internal [facility]  Jul 10, 2024      Assessment & Plan     (Z00.00) Routine general medical examination at a health care facility  (primary encounter diagnosis)  Comment: establishing primary care. Up to date on cancer screening.   Plan: Comprehensive metabolic panel (BMP + Alb, Alk         Phos, ALT, AST, Total. Bili, TP), Hemoglobin         A1c            (E78.5) Hyperlipidemia LDL goal <70  Comment: stable, on atorvastatin  Plan: Lipid panel reflex to direct LDL Non-fasting            (Z11.4) Screening for HIV (human immunodeficiency virus)  Comment: Routine screening.  Low risk.    Plan: HIV Antigen Antibody Combo            (Z11.59) Need for hepatitis C screening test  Comment: Routine screening.  Low risk.   Plan: Hepatitis C Screen Reflex to HCV RNA Quant and         Genotype            (L30.1) Dyshidrotic eczema  Comment: on the hands, washes hands frequently at work, exacerbating.   Plan: triamcinolone (KENALOG) 0.1 % external ointment                   BMI  Estimated body mass index is 33.46 kg/m  as calculated from the following:    Height as of this encounter: 1.665 m (5' 5.55\").    Weight as of this encounter: 92.8 kg (204 lb 8 oz).   Weight management plan: Discussed healthy diet and exercise guidelines    Counseling  Appropriate preventive services were discussed with this patient, including applicable screening as appropriate for fall prevention, nutrition, physical activity, Tobacco-use cessation, weight loss and cognition.  Checklist reviewing preventive services available has been given to the patient.  Reviewed patient's diet, addressing concerns and/or questions.           Preethi Alanis is a 54 year old, presenting for the following:  Physical and Establish Care        7/10/2024    12:48 PM   Additional Questions   Roomed by [facility]   Accompanied by self        Health Care Directive  Patient does not have a Health Care " Directive or Living Will: Discussed advance care planning with patient; however, patient declined at this time.    KATHERINE Alanis is here for a preventive health visit.  Overall, he is doing well with the following concerns:      Curious about blood pressure. Overall has been much better since starting medication. Takes lisinopril-hydrochlorothiazide 10-12.5  Back pain - given steroids and flexeril. Has tried PT, other medicines without significant improvement.   Eczema - has had this for decades. Has gotten different treatments in the past, including light therapy. Not currently seeing a dermatologist.               7/10/2024   General Health   How would you rate your overall physical health? Excellent   Feel stress (tense, anxious, or unable to sleep) Very much      (!) STRESS CONCERN      7/10/2024   Nutrition   Three or more servings of calcium each day? Yes   Diet: Regular (no restrictions)   How many servings of fruit and vegetables per day? (!) 2-3   How many sweetened beverages each day? (!) 2            7/10/2024   Exercise   Days per week of moderate/strenous exercise 4 days   Average minutes spent exercising at this level 30 min            7/10/2024   Social Factors   Frequency of gathering with friends or relatives Once a week   Worry food won't last until get money to buy more No   Food not last or not have enough money for food? No   Do you have housing? (Housing is defined as stable permanent housing and does not include staying ouside in a car, in a tent, in an abandoned building, in an overnight shelter, or couch-surfing.) Yes   Are you worried about losing your housing? Yes   Lack of transportation? No   Unable to get utilities (heat,electricity)? No   Want help with housing or utility concern? No      (!) HOUSING CONCERN PRESENT      7/10/2024   Fall Risk   Fallen 2 or more times in the past year? Yes   Trouble with walking or balance? Yes   Gait Speed Test (Document in seconds) 4   Gait Speed Test  "Interpretation Less than or equal to 5.00 seconds - PASS             7/10/2024   Dental   Dentist two times every year? Yes            7/10/2024   TB Screening   Were you born outside of the [place]? Yes              Today's PHQ-2 Score:       7/2/2024     9:51 AM   PHQ-2 ( 1999 [employer])   Q1: Little interest or pleasure in doing things 1   Q2: Feeling down, depressed or hopeless 1   PHQ-2 Score 2         7/10/2024   Substance Use   Alcohol more than 3/day or more than 7/wk No   Do you use any other substances recreationally? No        Social History     Tobacco Use    Smoking status: Never     Passive exposure: Never    Smokeless tobacco: Never   Vaping Use    Vaping status: Never Used   Substance Use Topics    Alcohol use: No    Drug use: No             7/10/2024   One time HIV Screening   Previous HIV test? No          7/10/2024   STI Screening   New sexual partner(s) since last STI/HIV test? No      Last PSA: No results found for: \"PSA\"  ASCVD Risk   The ASCVD Risk score (Jenn RUSSELL, et al., 2019) failed to calculate for the following reasons:    Cannot find a previous HDL lab    Cannot find a previous total cholesterol lab           Reviewed and updated as needed this visit by Provider   Tobacco  Allergies  Meds  Problems  Med Hx  Surg Hx  Fam Hx                     Objective    Exam  /81 (BP Location: Right arm, Patient Position: Sitting, Cuff Size: Adult Large)   Pulse 96   Temp 97.6  F (36.4  C) (Tympanic)   Resp 16   Ht 1.665 m (5' 5.55\")   Wt 92.8 kg (204 lb 8 oz)   SpO2 97%   BMI 33.46 kg/m     Estimated body mass index is 33.46 kg/m  as calculated from the following:    Height as of this encounter: 1.665 m (5' 5.55\").    Weight as of this encounter: 92.8 kg (204 lb 8 oz).    Physical Exam  GENERAL: alert and no distress  EYES: Eyes grossly normal to inspection, PERRL and conjunctivae and sclerae normal  HENT: ear canals and TM's normal, nose and mouth without ulcers or " lesions  NECK: no adenopathy, no asymmetry, masses, or scars  RESP: lungs clear to auscultation - no rales, rhonchi or wheezes  CV: regular rate and rhythm, normal S1 S2, no S3 or S4, no murmur, click or rub, no peripheral edema  MS: no gross musculoskeletal defects noted, no edema  SKIN: no suspicious lesions or rashes  NEURO: Normal strength and tone, mentation intact and speech normal  PSYCH: mentation appears normal, affect normal/bright        Signed Electronically by: Milly Peoples MD

## 2024-07-10 NOTE — PATIENT INSTRUCTIONS
Patient Education   Preventive Care Advice   This is general advice given by our system to help you stay healthy. However, your care team may have specific advice just for you. Please talk to your care team about your preventive care needs.  Nutrition  Eat 5 or more servings of fruits and vegetables each day.  Try wheat bread, brown rice and whole grain pasta (instead of white bread, rice, and pasta).  Get enough calcium and vitamin D. Check the label on foods and aim for 100% of the RDA (recommended daily allowance).  Lifestyle  Exercise at least 150 minutes each week  (30 minutes a day, 5 days a week).  Do muscle strengthening activities 2 days a week. These help control your weight and prevent disease.  No smoking.  Wear sunscreen to prevent skin cancer.  Have a dental exam and cleaning every 6 months.  Yearly exams  See your health care team every year to talk about:  Any changes in your health.  Any medicines your care team has prescribed.  Preventive care, family planning, and ways to prevent chronic diseases.  Shots (vaccines)   HPV shots (up to age 26), if you've never had them before.  Hepatitis B shots (up to age 59), if you've never had them before.  COVID-19 shot: Get this shot when it's due.  Flu shot: Get a flu shot every year.  Tetanus shot: Get a tetanus shot every 10 years.  Pneumococcal, hepatitis A, and RSV shots: Ask your care team if you need these based on your risk.  Shingles shot (for age 50 and up)  General health tests  Diabetes screening:  Starting at age 35, Get screened for diabetes at least every 3 years.  If you are younger than age 35, ask your care team if you should be screened for diabetes.  Cholesterol test: At age 39, start having a cholesterol test every 5 years, or more often if advised.  Bone density scan (DEXA): At age 50, ask your care team if you should have this scan for osteoporosis (brittle bones).  Hepatitis C: Get tested at least once in your life.  STIs (sexually  transmitted infections)  Before age 24: Ask your care team if you should be screened for STIs.  After age 24: Get screened for STIs if you're at risk. You are at risk for STIs (including HIV) if:  You are sexually active with more than one person.  You don't use condoms every time.  You or a partner was diagnosed with a sexually transmitted infection.  If you are at risk for HIV, ask about PrEP medicine to prevent HIV.  Get tested for HIV at least once in your life, whether you are at risk for HIV or not.  Cancer screening tests  Cervical cancer screening: If you have a cervix, begin getting regular cervical cancer screening tests starting at age 21.  Breast cancer scan (mammogram): If you've ever had breasts, begin having regular mammograms starting at age 40. This is a scan to check for breast cancer.  Colon cancer screening: It is important to start screening for colon cancer at age 45.  Have a colonoscopy test every 10 years (or more often if you're at risk) Or, ask your provider about stool tests like a FIT test every year or Cologuard test every 3 years.  To learn more about your testing options, visit:   .  For help making a decision, visit:   https://bit.ly/an95913.  Prostate cancer screening test: If you have a prostate, ask your care team if a prostate cancer screening test (PSA) at age 55 is right for you.  Lung cancer screening: If you are a current or former smoker ages 50 to 80, ask your care team if ongoing lung cancer screenings are right for you.  For informational purposes only. Not to replace the advice of your health care provider. Copyright   2023 [facility]. All rights reserved. Clinically reviewed by the [facility]. [employer] 532101 - REV 01/24.  Preventing Falls: Care Instructions  Injuries and health problems such as trouble walking or poor eyesight can increase your risk of falling. So can some medicines. But there are things you can do to help  "prevent falls. You can exercise to get stronger. You can also arrange your home to make it safer.    Talk to your doctor about the medicines you take. Ask if any of them increase the risk of falls and whether they can be changed or stopped.   Try to exercise regularly. It can help improve your strength and balance. This can help lower your risk of falling.     Practice fall safety and prevention.    Wear low-heeled shoes that fit well and give your feet good support. Talk to your doctor if you have foot problems that make this hard.  Carry a cellphone or wear a medical alert device that you can use to call for help.  Use stepladders instead of chairs to reach high objects. Don't climb if you're at risk for falls. Ask for help, if needed.  Wear the correct eyeglasses, if you need them.    Make your home safer.    Remove rugs, cords, clutter, and furniture from walkways.  Keep your house well lit. Use night-lights in hallways and bathrooms.  Install and use sturdy handrails on stairways.  Wear nonskid footwear, even inside. Don't walk barefoot or in socks without shoes.    Be safe outside.    Use handrails, curb cuts, and ramps whenever possible.  Keep your hands free by using a shoulder bag or backpack.  Try to walk in well-lit areas. Watch out for uneven ground, changes in pavement, and debris.  Be careful in the winter. Walk on the grass or gravel when sidewalks are slippery. Use de-icer on steps and walkways. Add non-slip devices to shoes.    Put grab bars and nonskid mats in your shower or tub and near the toilet. Try to use a shower chair or bath bench when bathing.   Get into a tub or shower by putting in your weaker leg first. Get out with your strong side first. Have a phone or medical alert device in the bathroom with you.   Where can you learn more?  Go to https://www.[employer].net/patiented  Enter G117 in the search box to learn more about \"Preventing Falls: Care Instructions.\"  Current as of: July 17, " 2023               Content Version: 14.0    0193-6580 [employer].   Care instructions adapted under license by your healthcare professional. If you have questions about a medical condition or this instruction, always ask your healthcare professional. [employer] disclaims any warranty or liability for your use of this information.      Learning About Stress  What is stress?     Stress is your body's response to a hard situation. Your body can have a physical, emotional, or mental response. Stress is a fact of life for most people, and it affects everyone differently. What causes stress for you may not be stressful for someone else.  A lot of things can cause stress. You may feel stress when you go on a job interview, take a test, or run a race. This kind of short-term stress is normal and even useful. It can help you if you need to work hard or react quickly. For example, stress can help you finish an important job on time.  Long-term stress is caused by ongoing stressful situations or events. Examples of long-term stress include long-term health problems, ongoing problems at work, or conflicts in your family. Long-term stress can harm your health.  How does stress affect your health?  When you are stressed, your body responds as though you are in danger. It makes hormones that speed up your heart, make you breathe faster, and give you a burst of energy. This is called the fight-or-flight stress response. If the stress is over quickly, your body goes back to normal and no harm is done.  But if stress happens too often or lasts too long, it can have bad effects. Long-term stress can make you more likely to get sick, and it can make symptoms of some diseases worse. If you tense up when you are stressed, you may develop neck, shoulder, or low back pain. Stress is linked to high blood pressure and heart disease.  Stress also harms your emotional health. It can make you golden, tense, or  depressed. Your relationships may suffer, and you may not do well at work or school.  What can you do to manage stress?  You can try these things to help manage stress:   Do something active. Exercise or activity can help reduce stress. Walking is a great way to get started. Even everyday activities such as housecleaning or yard work can help.  Try yoga or lluvia chi. These techniques combine exercise and meditation. You may need some training at first to learn them.  Do something you enjoy. For example, listen to music or go to a movie. Practice your hobby or do volunteer work.  Meditate. This can help you relax, because you are not worrying about what happened before or what may happen in the future.  Do guided imagery. Imagine yourself in any setting that helps you feel calm. You can use online videos, books, or a teacher to guide you.  Do breathing exercises. For example:  From a standing position, bend forward from the waist with your knees slightly bent. Let your arms dangle close to the floor.  Breathe in slowly and deeply as you return to a standing position. Roll up slowly and lift your head last.  Hold your breath for just a few seconds in the standing position.  Breathe out slowly and bend forward from the waist.  Let your feelings out. Talk, laugh, cry, and express anger when you need to. Talking with supportive friends or family, a counselor, or a yusuf leader about your feelings is a healthy way to relieve stress. Avoid discussing your feelings with people who make you feel worse.  Write. It may help to write about things that are bothering you. This helps you find out how much stress you feel and what is causing it. When you know this, you can find better ways to cope.  What can you do to prevent stress?  You might try some of these things to help prevent stress:  Manage your time. This helps you find time to do the things you want and need to do.  Get enough sleep. Your body recovers from the stresses  "of the day while you are sleeping.  Get support. Your family, friends, and community can make a difference in how you experience stress.  Limit your news feed. Avoid or limit time on social media or news that may make you feel stressed.  Do something active. Exercise or activity can help reduce stress. Walking is a great way to get started.  Where can you learn more?  Go to https://www.[employer]net/patiented  Enter N032 in the search box to learn more about \"Learning About Stress.\"  Current as of: October 24, 2023               Content Version: 14.0    0584-8165 [employer].   Care instructions adapted under license by your healthcare professional. If you have questions about a medical condition or this instruction, always ask your healthcare professional. [employer] disclaims any warranty or liability for your use of this information.         "

## 2024-07-11 LAB
ALBUMIN SERPL BCG-MCNC: 4.4 G/DL (ref 3.5–5.2)
ALP SERPL-CCNC: 102 U/L (ref 40–150)
ALT SERPL W P-5'-P-CCNC: 39 U/L (ref 0–70)
ANION GAP SERPL CALCULATED.3IONS-SCNC: 10 MMOL/L (ref 7–15)
AST SERPL W P-5'-P-CCNC: 31 U/L (ref 0–45)
BILIRUB SERPL-MCNC: 1.3 MG/DL
BUN SERPL-MCNC: 15.4 MG/DL (ref 6–20)
CALCIUM SERPL-MCNC: 9.4 MG/DL (ref 8.6–10)
CHLORIDE SERPL-SCNC: 102 MMOL/L (ref 98–107)
CHOLEST SERPL-MCNC: 163 MG/DL
CREAT SERPL-MCNC: 0.87 MG/DL (ref 0.67–1.17)
DEPRECATED HCO3 PLAS-SCNC: 26 MMOL/L (ref 22–29)
EGFRCR SERPLBLD CKD-EPI 2021: >90 ML/MIN/1.73M2
FASTING STATUS PATIENT QL REPORTED: NO
FASTING STATUS PATIENT QL REPORTED: NO
GLUCOSE SERPL-MCNC: 111 MG/DL (ref 70–99)
HCV AB SERPL QL IA: NONREACTIVE
HDLC SERPL-MCNC: 37 MG/DL
HIV 1+2 AB+HIV1 P24 AG SERPL QL IA: NONREACTIVE
LDLC SERPL CALC-MCNC: 93 MG/DL
NONHDLC SERPL-MCNC: 126 MG/DL
POTASSIUM SERPL-SCNC: 3.7 MMOL/L (ref 3.4–5.3)
PROT SERPL-MCNC: 7.8 G/DL (ref 6.4–8.3)
SODIUM SERPL-SCNC: 138 MMOL/L (ref 135–145)
TRIGL SERPL-MCNC: 166 MG/DL

## 2024-07-25 ENCOUNTER — TELEPHONE (OUTPATIENT)
Dept: NEUROSURGERY | Facility: CLINIC | Age: 54
End: 2024-07-25
Payer: COMMERCIAL

## 2024-07-25 NOTE — TELEPHONE ENCOUNTER
Called patient to see where mri was done. LVM with callback number  to advise where he had mri done.

## 2024-07-29 ENCOUNTER — TELEPHONE (OUTPATIENT)
Dept: NEUROSURGERY | Facility: CLINIC | Age: 54
End: 2024-07-29
Payer: COMMERCIAL

## 2024-07-29 NOTE — TELEPHONE ENCOUNTER
Patient's appointment for 7/30/24 (phone) needs to be rescheduled due to not having MRI completed. Writer called patient via  services to call back to r/s when they are able and advised appointment will be canceled.

## 2024-08-20 DIAGNOSIS — I10 BENIGN ESSENTIAL HYPERTENSION: ICD-10-CM

## 2024-08-20 DIAGNOSIS — M54.50 LUMBAR PAIN: ICD-10-CM

## 2024-08-21 RX ORDER — LISINOPRIL/HYDROCHLOROTHIAZIDE 10-12.5 MG
1 TABLET ORAL DAILY
Qty: 90 TABLET | Refills: 2 | Status: SHIPPED | OUTPATIENT
Start: 2024-08-21

## 2024-08-21 NOTE — CONFIDENTIAL NOTE
There is a note by neurosurgery (7/9/24) that says he did not have relief of his back pain from the medrol dosepack.  What is the reason for refill request?    Milly Peoples MD   Internal Medicine - Pediatrics

## 2024-08-22 NOTE — TELEPHONE ENCOUNTER
Attempted to reach patient, left voicemail to call back and speak with any triage nurse.   Chucho PAULSON RN 8/22/2024 at 3:04 PM

## 2024-08-23 DIAGNOSIS — R73.03 PREDIABETES: Primary | ICD-10-CM

## 2024-08-26 RX ORDER — METHYLPREDNISOLONE 4 MG
TABLET, DOSE PACK ORAL
Qty: 21 TABLET | Refills: 0 | OUTPATIENT
Start: 2024-08-26

## 2024-08-26 RX ORDER — METFORMIN HCL 500 MG
1000 TABLET, EXTENDED RELEASE 24 HR ORAL DAILY
Qty: 180 TABLET | Refills: 3 | Status: SHIPPED | OUTPATIENT
Start: 2024-08-26

## 2024-08-26 NOTE — TELEPHONE ENCOUNTER
Attempt #3, left voicemail to call back and speak with any triage nurse.     Chucho PAULSON RN 8/26/2024 at 10:47 AM

## 2024-09-19 ENCOUNTER — OFFICE VISIT (OUTPATIENT)
Dept: URGENT CARE | Facility: URGENT CARE | Age: 54
End: 2024-09-19
Payer: COMMERCIAL

## 2024-09-19 VITALS
OXYGEN SATURATION: 99 % | SYSTOLIC BLOOD PRESSURE: 121 MMHG | BODY MASS INDEX: 33.38 KG/M2 | RESPIRATION RATE: 16 BRPM | TEMPERATURE: 98 F | DIASTOLIC BLOOD PRESSURE: 89 MMHG | WEIGHT: 204 LBS | HEART RATE: 87 BPM

## 2024-09-19 DIAGNOSIS — M54.42 MIDLINE LOW BACK PAIN WITH LEFT-SIDED SCIATICA, UNSPECIFIED CHRONICITY: Primary | ICD-10-CM

## 2024-09-19 DIAGNOSIS — M62.830 BACK MUSCLE SPASM: ICD-10-CM

## 2024-09-19 PROCEDURE — 99214 OFFICE O/P EST MOD 30 MIN: CPT | Mod: 25 | Performed by: FAMILY MEDICINE

## 2024-09-19 PROCEDURE — 96372 THER/PROPH/DIAG INJ SC/IM: CPT | Performed by: FAMILY MEDICINE

## 2024-09-19 RX ORDER — METHOCARBAMOL 750 MG/1
750 TABLET, FILM COATED ORAL 4 TIMES DAILY PRN
Qty: 30 TABLET | Refills: 0 | Status: SHIPPED | OUTPATIENT
Start: 2024-09-19

## 2024-09-19 RX ORDER — KETOROLAC TROMETHAMINE 30 MG/ML
30 INJECTION, SOLUTION INTRAMUSCULAR; INTRAVENOUS ONCE
Status: COMPLETED | OUTPATIENT
Start: 2024-09-19 | End: 2024-09-19

## 2024-09-19 RX ADMIN — KETOROLAC TROMETHAMINE 30 MG: 30 INJECTION, SOLUTION INTRAMUSCULAR; INTRAVENOUS at 18:44

## 2024-09-19 NOTE — LETTER
September 19, 2024      Zeke Treadwell  [address] [place] [address]        To Whom It May Concern:    Zeke Treadwell  was seen on 9/19.  Please excuse him from work 9/20-9/23 due to back injury/back pain.        Sincerely,        Femi Lundberg MD

## 2024-09-19 NOTE — PATIENT INSTRUCTIONS
Please follow up with Neurosurgery for further treatment options    Okay to try different muscle relaxant - robaxin instead of flexeril    Heat to area of muscle area  Ice to area of spine

## 2024-09-19 NOTE — PROGRESS NOTES
SUBJECTIVE:  Chief Complaint   Patient presents with    Back Pain     53 yo M presents with the following complaint  lower back pain difficult to apply weight onset yesterday Pt was lifting an AC which caused the strain     Zeke Treadwell is a 54 year old male who presents with a chief complaint of back pain.    Lumbar Xray 1/28/2022 thru [facility]  IMPRESSION: 5 nonrib-bearing lumbar type vertebral bodies. Lumbar spine lordosis is maintained. Bilateral L5 pars defects with grade 1 L5 on S1 anterolisthesis measuring 2 mm. Grade 1 retrolisthesis of L4 on L5. Lumbar vertebral body heights are maintained. Mild loss of disc space height at L5-S1.     Seen by Neurosurgery 7/9/2024, refer for MRI - unable to do due to claustrophobia    Was lifting air conditioner up and developed back pain, legs felt weak and stumble.  Did not drop air conditioner.  This was yesterday.  Now having increase in pain, more pain on left side.    Has muscle relaxant, flexeril which helps a little    Past Medical History:   Diagnosis Date    Bone infection, shoulder region (H)     right     Current Outpatient Medications   Medication Sig Dispense Refill    atorvastatin (LIPITOR) 20 MG tablet Take 1 tablet (20 mg) by mouth at bedtime 30 tablet 0    cyclobenzaprine (FLEXERIL) 10 MG tablet Take 1 tablet (10 mg) by mouth 3 times daily as needed for muscle spasms 15 tablet 0    lisinopril-hydrochlorothiazide (ZESTORETIC) 10-12.5 MG tablet Take 1 tablet by mouth once daily 90 tablet 2    metFORMIN (GLUCOPHAGE XR) 500 MG 24 hr tablet Take 2 tablets (1,000 mg) by mouth daily. 180 tablet 3    triamcinolone (KENALOG) 0.1 % external ointment Apply topically 2 times daily 30 g 1    methylPREDNISolone (MEDROL DOSEPAK) 4 MG tablet therapy pack Follow Package Directions (Patient not taking: Reported on 7/9/2024) 21 tablet 0     Social History     Tobacco Use    Smoking status: Never     Passive exposure: Never    Smokeless tobacco: Never   Substance Use  Topics    Alcohol use: No       ROS:  Review of systems negative except as stated above.    EXAM:   /89   Pulse 87   Temp 98  F (36.7  C)   Resp 16   Wt 92.5 kg (204 lb)   SpO2 99%   BMI 33.38 kg/m    GENERAL APPEARANCE: healthy, alert and mild distress  BACK: tenderness L3-5 region, tenderness on left paraspinal muscles, decrease ROM  SKIN: no suspicious lesions or rashes  PSYCH:alert, affect bright    X-RAY was not done.    ASSESSMENT/PLAN:  (M54.42) Midline low back pain with left-sided sciatica, unspecified chronicity  (primary encounter diagnosis)  Comment: acute on chronic  Plan: ketorolac (TORADOL) injection 30 mg            (M62.830) Back muscle spasm  Comment: acute on chronic  Plan: ketorolac (TORADOL) injection 30 mg,         methocarbamol (ROBAXIN) 750 MG tablet            Reviewed that due to underlying chronic back symptoms that cause flare, acute on chronic.  Will need to obtain MRI per Neurosurgery specialist for further evaluation.  Discussed UC scope - Toradol 30 mg IM for pain, new RX robaxin 750 mg for muscle spasm.  Okay for ice and heat.    Work excuse note given  Follow up with back specialist or primary provider within 1 week    Femi Lundberg MD  September 19, 2024 7:01 PM

## 2025-05-09 ENCOUNTER — OFFICE VISIT (OUTPATIENT)
Dept: PEDIATRICS | Facility: CLINIC | Age: 55
End: 2025-05-09
Payer: COMMERCIAL

## 2025-05-09 VITALS
TEMPERATURE: 98 F | WEIGHT: 214.4 LBS | RESPIRATION RATE: 20 BRPM | HEIGHT: 66 IN | OXYGEN SATURATION: 97 % | DIASTOLIC BLOOD PRESSURE: 78 MMHG | BODY MASS INDEX: 34.46 KG/M2 | SYSTOLIC BLOOD PRESSURE: 120 MMHG | HEART RATE: 81 BPM

## 2025-05-09 DIAGNOSIS — M54.9 BACK PAIN WITHOUT RADIATION: ICD-10-CM

## 2025-05-09 DIAGNOSIS — I10 BENIGN ESSENTIAL HYPERTENSION: ICD-10-CM

## 2025-05-09 DIAGNOSIS — F40.240 CLAUSTROPHOBIA: ICD-10-CM

## 2025-05-09 DIAGNOSIS — I10 ESSENTIAL HYPERTENSION: Primary | ICD-10-CM

## 2025-05-09 DIAGNOSIS — E78.5 HYPERLIPIDEMIA LDL GOAL <70: ICD-10-CM

## 2025-05-09 DIAGNOSIS — L30.1 DYSHIDROTIC ECZEMA: ICD-10-CM

## 2025-05-09 DIAGNOSIS — Z12.11 SPECIAL SCREENING FOR MALIGNANT NEOPLASMS, COLON: ICD-10-CM

## 2025-05-09 DIAGNOSIS — R73.03 PREDIABETES: ICD-10-CM

## 2025-05-09 DIAGNOSIS — R39.11 URINARY HESITANCY: ICD-10-CM

## 2025-05-09 DIAGNOSIS — Z12.5 SCREENING FOR PROSTATE CANCER: ICD-10-CM

## 2025-05-09 LAB
ALBUMIN SERPL BCG-MCNC: 4.3 G/DL (ref 3.5–5.2)
ALBUMIN UR-MCNC: NEGATIVE MG/DL
ALP SERPL-CCNC: 103 U/L (ref 40–150)
ALT SERPL W P-5'-P-CCNC: 39 U/L (ref 0–70)
ANION GAP SERPL CALCULATED.3IONS-SCNC: 11 MMOL/L (ref 7–15)
APPEARANCE UR: CLEAR
AST SERPL W P-5'-P-CCNC: 40 U/L (ref 0–45)
BILIRUB SERPL-MCNC: 0.9 MG/DL
BILIRUB UR QL STRIP: NEGATIVE
BUN SERPL-MCNC: 15.5 MG/DL (ref 6–20)
CALCIUM SERPL-MCNC: 9.5 MG/DL (ref 8.8–10.4)
CHLORIDE SERPL-SCNC: 103 MMOL/L (ref 98–107)
CHOLEST SERPL-MCNC: 163 MG/DL
COLOR UR AUTO: YELLOW
CREAT SERPL-MCNC: 0.82 MG/DL (ref 0.67–1.17)
CREAT UR-MCNC: 119 MG/DL
EGFRCR SERPLBLD CKD-EPI 2021: >90 ML/MIN/1.73M2
EST. AVERAGE GLUCOSE BLD GHB EST-MCNC: 123 MG/DL
FASTING STATUS PATIENT QL REPORTED: YES
FASTING STATUS PATIENT QL REPORTED: YES
GLUCOSE SERPL-MCNC: 109 MG/DL (ref 70–99)
GLUCOSE UR STRIP-MCNC: NEGATIVE MG/DL
HBA1C MFR BLD: 5.9 % (ref 0–5.6)
HCO3 SERPL-SCNC: 24 MMOL/L (ref 22–29)
HDLC SERPL-MCNC: 40 MG/DL
HGB UR QL STRIP: NEGATIVE
KETONES UR STRIP-MCNC: NEGATIVE MG/DL
LDLC SERPL CALC-MCNC: 99 MG/DL
LEUKOCYTE ESTERASE UR QL STRIP: NEGATIVE
MICROALBUMIN UR-MCNC: <12 MG/L
MICROALBUMIN/CREAT UR: NORMAL MG/G{CREAT}
NITRATE UR QL: NEGATIVE
NONHDLC SERPL-MCNC: 123 MG/DL
PH UR STRIP: 5.5 [PH] (ref 5–7)
POTASSIUM SERPL-SCNC: 3.6 MMOL/L (ref 3.4–5.3)
PROT SERPL-MCNC: 7.4 G/DL (ref 6.4–8.3)
PSA SERPL DL<=0.01 NG/ML-MCNC: 1.9 NG/ML (ref 0–3.5)
SODIUM SERPL-SCNC: 138 MMOL/L (ref 135–145)
SP GR UR STRIP: 1.02 (ref 1–1.03)
TRIGL SERPL-MCNC: 118 MG/DL
UROBILINOGEN UR STRIP-ACNC: 0.2 E.U./DL

## 2025-05-09 PROCEDURE — 82570 ASSAY OF URINE CREATININE: CPT | Performed by: INTERNAL MEDICINE

## 2025-05-09 PROCEDURE — 80061 LIPID PANEL: CPT | Performed by: INTERNAL MEDICINE

## 2025-05-09 PROCEDURE — 99214 OFFICE O/P EST MOD 30 MIN: CPT | Performed by: INTERNAL MEDICINE

## 2025-05-09 PROCEDURE — G2211 COMPLEX E/M VISIT ADD ON: HCPCS | Performed by: INTERNAL MEDICINE

## 2025-05-09 PROCEDURE — 36415 COLL VENOUS BLD VENIPUNCTURE: CPT | Performed by: INTERNAL MEDICINE

## 2025-05-09 PROCEDURE — 83036 HEMOGLOBIN GLYCOSYLATED A1C: CPT | Performed by: INTERNAL MEDICINE

## 2025-05-09 PROCEDURE — 80053 COMPREHEN METABOLIC PANEL: CPT | Performed by: INTERNAL MEDICINE

## 2025-05-09 PROCEDURE — G0103 PSA SCREENING: HCPCS | Performed by: INTERNAL MEDICINE

## 2025-05-09 PROCEDURE — 3078F DIAST BP <80 MM HG: CPT | Performed by: INTERNAL MEDICINE

## 2025-05-09 PROCEDURE — 82043 UR ALBUMIN QUANTITATIVE: CPT | Performed by: INTERNAL MEDICINE

## 2025-05-09 PROCEDURE — 3074F SYST BP LT 130 MM HG: CPT | Performed by: INTERNAL MEDICINE

## 2025-05-09 PROCEDURE — 1125F AMNT PAIN NOTED PAIN PRSNT: CPT | Performed by: INTERNAL MEDICINE

## 2025-05-09 PROCEDURE — 81003 URINALYSIS AUTO W/O SCOPE: CPT | Performed by: INTERNAL MEDICINE

## 2025-05-09 RX ORDER — TAMSULOSIN HYDROCHLORIDE 0.4 MG/1
0.4 CAPSULE ORAL DAILY
Qty: 30 CAPSULE | Refills: 1 | Status: SHIPPED | OUTPATIENT
Start: 2025-05-09

## 2025-05-09 RX ORDER — METFORMIN HYDROCHLORIDE 500 MG/1
1000 TABLET, EXTENDED RELEASE ORAL DAILY
Qty: 180 TABLET | Refills: 3 | Status: SHIPPED | OUTPATIENT
Start: 2025-05-09

## 2025-05-09 RX ORDER — LORAZEPAM 1 MG/1
1 TABLET ORAL EVERY 6 HOURS PRN
Qty: 2 TABLET | Refills: 0 | Status: SHIPPED | OUTPATIENT
Start: 2025-05-09

## 2025-05-09 RX ORDER — LISINOPRIL AND HYDROCHLOROTHIAZIDE 10; 12.5 MG/1; MG/1
1 TABLET ORAL DAILY
Qty: 90 TABLET | Refills: 2 | Status: SHIPPED | OUTPATIENT
Start: 2025-05-09

## 2025-05-09 RX ORDER — CLOBETASOL PROPIONATE 0.5 MG/G
OINTMENT TOPICAL 2 TIMES DAILY
Qty: 45 G | Refills: 1 | Status: SHIPPED | OUTPATIENT
Start: 2025-05-09 | End: 2025-05-12

## 2025-05-09 RX ORDER — ATORVASTATIN CALCIUM 20 MG/1
20 TABLET, FILM COATED ORAL AT BEDTIME
Qty: 90 TABLET | Refills: 3 | Status: SHIPPED | OUTPATIENT
Start: 2025-05-09

## 2025-05-09 ASSESSMENT — ANXIETY QUESTIONNAIRES
IF YOU CHECKED OFF ANY PROBLEMS ON THIS QUESTIONNAIRE, HOW DIFFICULT HAVE THESE PROBLEMS MADE IT FOR YOU TO DO YOUR WORK, TAKE CARE OF THINGS AT HOME, OR GET ALONG WITH OTHER PEOPLE: VERY DIFFICULT
7. FEELING AFRAID AS IF SOMETHING AWFUL MIGHT HAPPEN: SEVERAL DAYS
6. BECOMING EASILY ANNOYED OR IRRITABLE: MORE THAN HALF THE DAYS
GAD7 TOTAL SCORE: 11
5. BEING SO RESTLESS THAT IT IS HARD TO SIT STILL: MORE THAN HALF THE DAYS
2. NOT BEING ABLE TO STOP OR CONTROL WORRYING: SEVERAL DAYS
2. NOT BEING ABLE TO STOP OR CONTROL WORRYING: SEVERAL DAYS
4. TROUBLE RELAXING: MORE THAN HALF THE DAYS
7. FEELING AFRAID AS IF SOMETHING AWFUL MIGHT HAPPEN: SEVERAL DAYS
1. FEELING NERVOUS, ANXIOUS, OR ON EDGE: SEVERAL DAYS
8. IF YOU CHECKED OFF ANY PROBLEMS, HOW DIFFICULT HAVE THESE MADE IT FOR YOU TO DO YOUR WORK, TAKE CARE OF THINGS AT HOME, OR GET ALONG WITH OTHER PEOPLE?: VERY DIFFICULT
5. BEING SO RESTLESS THAT IT IS HARD TO SIT STILL: MORE THAN HALF THE DAYS
3. WORRYING TOO MUCH ABOUT DIFFERENT THINGS: MORE THAN HALF THE DAYS
GAD7 TOTAL SCORE: 11
1. FEELING NERVOUS, ANXIOUS, OR ON EDGE: SEVERAL DAYS
IF YOU CHECKED OFF ANY PROBLEMS ON THIS QUESTIONNAIRE, HOW DIFFICULT HAVE THESE PROBLEMS MADE IT FOR YOU TO DO YOUR WORK, TAKE CARE OF THINGS AT HOME, OR GET ALONG WITH OTHER PEOPLE: VERY DIFFICULT
7. FEELING AFRAID AS IF SOMETHING AWFUL MIGHT HAPPEN: SEVERAL DAYS
4. TROUBLE RELAXING: MORE THAN HALF THE DAYS
3. WORRYING TOO MUCH ABOUT DIFFERENT THINGS: MORE THAN HALF THE DAYS
6. BECOMING EASILY ANNOYED OR IRRITABLE: MORE THAN HALF THE DAYS

## 2025-05-09 ASSESSMENT — PAIN SCALES - GENERAL: PAINLEVEL_OUTOF10: SEVERE PAIN (8)

## 2025-05-09 NOTE — TELEPHONE ENCOUNTER
Pharmacy: Please specify area on body where this is ro be applied.    clobetasol (TEMOVATE) 0.05 % external ointment 45 g 1 5/9/2025 -- No   Sig - Route: Apply topically 2 times daily. - Topical

## 2025-05-09 NOTE — PROGRESS NOTES
Assessment & Plan     (I10) Essential hypertension  (primary encounter diagnosis)  Comment: no change to losartan- hydrochlorothiazide     (R73.03) Prediabetes  Comment: due for labs. Continue metformin  Plan: **Hemoglobin A1c FUTURE 3mo, Comprehensive         metabolic panel (BMP + Alb, Alk Phos, ALT, AST,        Total. Bili, TP), metFORMIN (GLUCOPHAGE XR) 500        MG 24 hr tablet            (M54.9) Back pain without radiation  Comment: has tried to get an MRI (ordered by neurosurgery) in the past, but could not complete it due to claustrophobia.  Can try again with lorazepam to calm anxiety.   Plan: MR Lumbar Spine w/o Contrast, MR Cervical Spine        w/o Contrast            (F40.240) Claustrophobia  Comment: can take up to 2 mg as needed prior to MRI  Plan: LORazepam (ATIVAN) 1 MG tablet            (R39.11) Urinary hesitancy  Comment: start tamsulosin. Follow up. If this is not helpful, will add finasteride and consider referral to urology.  Plan: tamsulosin (FLOMAX) 0.4 MG capsule, UA         Macroscopic with reflex to Microscopic and         Culture - Lab Collect           (Z12.5) Screening for prostate cancer  Comment: routine  Plan: PSA, screen            (Z12.11) Special screening for malignant neoplasms, colon  Comment: routine  Plan: Colonoscopy Screening  Referral            (I10) Benign essential hypertension  Comment: due for labs.  Plan: Comprehensive metabolic panel (BMP + Alb, Alk         Phos, ALT, AST, Total. Bili, TP),         lisinopril-hydrochlorothiazide (ZESTORETIC)         10-12.5 MG tablet, Albumin Random Urine         Quantitative with Creat Ratio            (E78.5) Hyperlipidemia LDL goal <70  Comment: due for labs, refill  Plan: Lipid panel reflex to direct LDL Non-fasting,         atorvastatin (LIPITOR) 20 MG tablet            (L30.1) Dyshidrotic eczema  Comment: can use topical steroid as needed  Plan: DISCONTINUED: clobetasol (TEMOVATE) 0.05 %         external ointment    "       The longitudinal plan of care for the diagnosis(es)/condition(s) as documented were addressed during this visit. Due to the added complexity in care, I will continue to support Zeke in the subsequent management and with ongoing continuity of care.           BMI  Estimated body mass index is 35.14 kg/m  as calculated from the following:    Height as of this encounter: 1.664 m (5' 5.5\").    Weight as of this encounter: 97.3 kg (214 lb 6.4 oz).             Subjective   Zeke is a 55 year old, presenting for the following health issues:   Follow Up, Diabetes, and Hypertension    History of Present Illness       Back Pain:  He presents for follow up of back pain. Patient's back pain is a recurring problem.  Location of back pain:  Right lower back, left lower back, right upper back, left upper back, right side of neck, left side of neck, right shoulder and left shoulder  Description of back pain: dull ache, fullness and other  Back pain spreads: right knee, left knee, right foot, left foot, right shoulder, left shoulder, right side of neck and left side of neck    Since patient first noticed back pain, pain is: always present, but gets better and worse  Does back pain interfere with his job:  Yes       Mental Health Follow-up:  Patient presents to follow-up on Depression & Anxiety.Patient's depression since last visit has been:  Bad  The patient is not having other symptoms associated with depression.  Patient's anxiety since last visit has been:  Bad  The patient is not having other symptoms associated with anxiety.  Any significant life events: financial concerns, health concerns and other  Patient is feeling anxious or having panic attacks.  Patient has no concerns about alcohol or drug use.    Diabetes:   He presents for follow up of diabetes.    He is not checking blood glucose.        He is concerned about blood sugar frequently over 200 and other.   He is having numbness in feet and weight gain.    " "        Hypertension: He presents for follow up of hypertension.  He does not check blood pressure  regularly outside of the clinic. Outside blood pressures have been over 140/90. He does not follow a low salt diet.     Headaches:   Since the patient's last clinic visit, headaches are: worsened  The patient is getting headaches:  Everyday  He is able to do normal daily activities when he has a migraine.  The patient is taking the following rescue/relief medications:  Ibuprofen (Advil, Motrin) and other   Patient states \"I get some relief\" from the rescue/relief medications.   The patient is taking the following medications to prevent migraines:  No medications to prevent migraines  In the past 4 weeks, the patient has gone to an Urgent Care or Emergency Room 0 times times due to headaches.    Reason for visit:  Check all symptom and medications    He eats 0-1 servings of fruits and vegetables daily.He consumes 3 sweetened beverage(s) daily.He exercises with enough effort to increase his heart rate 9 or less minutes per day.  He exercises with enough effort to increase his heart rate 3 or less days per week.   He is taking medications regularly.        BACK PAIN - tried to do MRI last summer, but couldn't because of claustrophobia.   At work lately when he is sitting for a bit, he has to get up slowly.  There is low back pain, sometimes into the leg and down into the foot.   BLOOD PRESSURE - does not check at home.  Had colonoscopy in November 2019, 5 year follow up recommended.  Needs order.   Has been having pain, not feeling good. Has some pain at the site of previous appendectomy (not strong, just a bother).   Also has a bit of blood streaking on stool, feels this may be due to a hemorrhoid.   Has urinary urgency, feels like he really has to go, but is only able to get a little bit. Sometimes he has an urge to go and is able to urinate normally.  Sometimes after he feels that he has finished, he has a bit of " "urine leakage.       The medical visit was conducted entirely in Irish language.                   Objective    /78 (BP Location: Right arm, Patient Position: Sitting, Cuff Size: Adult Large)   Pulse 81   Temp 98  F (36.7  C) (Temporal)   Resp 20   Ht 1.664 m (5' 5.5\")   Wt 97.3 kg (214 lb 6.4 oz)   SpO2 97%   BMI 35.14 kg/m    Body mass index is 35.14 kg/m .  Physical Exam   GENERAL: alert and no distress  EYES: Eyes grossly normal to inspection, PERRL and conjunctivae and sclerae normal  RESP: lungs clear to auscultation - no rales, rhonchi or wheezes  CV: regular rate and rhythm, normal S1 S2, no S3 or S4, no murmur, click or rub, no peripheral edema   Comprehensive back pain exam:  No tenderness, Range of motion not limited by pain, and Straight leg raise negative bilaterally            Signed Electronically by: Milly Peoples MD    "

## 2025-05-09 NOTE — PATIENT INSTRUCTIONS
Para la urgencia - Empeszamos el medicamento Tamsulosin, 1 tableta diaria.   Me comunicas en 2-4 semanas para lars karly va.     Te recomiendo que hagas mayte angie de seguimiento con Neurosurgery despues del MRI.     MRI - sallie mayte pastilla de lorazepam 30 minutos antes del procedimiento, puedes erin la segunda si es necesaria.     Vas a hacer angie para colonoscopia.    Ezcema - pones ointment 2 veces al jose.  Tambien vaselina o crema en las angelo lo mucho que puedas.  No usas el clobetasol por mas que 2 semanas.  Hay que erin un break.

## 2025-05-12 RX ORDER — CLOBETASOL PROPIONATE 0.5 MG/G
OINTMENT TOPICAL 2 TIMES DAILY
Qty: 45 G | Refills: 1 | Status: SHIPPED | OUTPATIENT
Start: 2025-05-12

## 2025-05-13 ENCOUNTER — RESULTS FOLLOW-UP (OUTPATIENT)
Dept: PEDIATRICS | Facility: CLINIC | Age: 55
End: 2025-05-13

## 2025-05-19 ENCOUNTER — APPOINTMENT (OUTPATIENT)
Dept: INTERPRETER SERVICES | Facility: CLINIC | Age: 55
End: 2025-05-19
Payer: COMMERCIAL

## 2025-05-27 ENCOUNTER — OFFICE VISIT (OUTPATIENT)
Dept: OPTOMETRY | Facility: CLINIC | Age: 55
End: 2025-05-27
Payer: COMMERCIAL

## 2025-05-27 DIAGNOSIS — H11.153 PINGUECULA OF BOTH EYES: ICD-10-CM

## 2025-05-27 DIAGNOSIS — H52.13 MYOPIA OF BOTH EYES WITH ASTIGMATISM: ICD-10-CM

## 2025-05-27 DIAGNOSIS — H52.4 PRESBYOPIA: ICD-10-CM

## 2025-05-27 DIAGNOSIS — H52.203 MYOPIA OF BOTH EYES WITH ASTIGMATISM: ICD-10-CM

## 2025-05-27 DIAGNOSIS — E11.9 TYPE 2 DIABETES MELLITUS WITHOUT RETINOPATHY (H): Primary | ICD-10-CM

## 2025-05-27 PROCEDURE — 92015 DETERMINE REFRACTIVE STATE: CPT | Performed by: OPTOMETRIST

## 2025-05-27 PROCEDURE — 92004 COMPRE OPH EXAM NEW PT 1/>: CPT | Performed by: OPTOMETRIST

## 2025-05-27 PROCEDURE — 2023F DILAT RTA XM W/O RTNOPTHY: CPT | Performed by: OPTOMETRIST

## 2025-05-27 ASSESSMENT — VISUAL ACUITY
OS_CC+: -2
OS_CC: 20/25
METHOD: SNELLEN - LINEAR
OD_CC: 20/20
OD_CC: 20/60
CORRECTION_TYPE: GLASSES
OS_CC: 20/60

## 2025-05-27 ASSESSMENT — CONF VISUAL FIELD
OS_NORMAL: 1
OD_NORMAL: 1
OS_SUPERIOR_NASAL_RESTRICTION: 0
OS_SUPERIOR_TEMPORAL_RESTRICTION: 0
OD_SUPERIOR_TEMPORAL_RESTRICTION: 0
OS_INFERIOR_TEMPORAL_RESTRICTION: 0
OD_SUPERIOR_NASAL_RESTRICTION: 0
OD_INFERIOR_NASAL_RESTRICTION: 0
METHOD: COUNTING FINGERS
OD_INFERIOR_TEMPORAL_RESTRICTION: 0
OS_INFERIOR_NASAL_RESTRICTION: 0

## 2025-05-27 ASSESSMENT — REFRACTION_WEARINGRX
OS_ADD: +2.00
OD_CYLINDER: +0.75
OS_SPHERE: -1.50
OS_CYLINDER: +1.00
SPECS_TYPE: PAL
OD_ADD: +2.00
OD_SPHERE: -1.50
OD_AXIS: 175
OS_AXIS: 013

## 2025-05-27 ASSESSMENT — REFRACTION_MANIFEST
OS_CYLINDER: +0.50
OD_CYLINDER: +0.75
OD_SPHERE: -1.25
OS_ADD: +2.50
OS_AXIS: 012
OS_SPHERE: -1.75
OS_SPHERE: -1.25
METHOD_AUTOREFRACTION: 1
OS_AXIS: 177
OD_ADD: +2.50
OD_AXIS: 176
OS_CYLINDER: +0.75
OD_AXIS: 180
OD_CYLINDER: +0.25
OD_SPHERE: -1.25

## 2025-05-27 ASSESSMENT — CUP TO DISC RATIO
OS_RATIO: 0.1
OD_RATIO: 0.1

## 2025-05-27 ASSESSMENT — TONOMETRY
IOP_METHOD: APPLANATION
OD_IOP_MMHG: 18
OS_IOP_MMHG: 16

## 2025-05-27 ASSESSMENT — EXTERNAL EXAM - LEFT EYE: OS_EXAM: NORMAL

## 2025-05-27 ASSESSMENT — SLIT LAMP EXAM - LIDS
COMMENTS: NORMAL
COMMENTS: NORMAL

## 2025-05-27 ASSESSMENT — EXTERNAL EXAM - RIGHT EYE: OD_EXAM: NORMAL

## 2025-05-27 NOTE — LETTER
5/27/2025      Zeke Treadwell  [address]  [place] [address]      Dear Colleague,    Thank you for referring your patient, [facility]readwell[facility]. Please see a copy of my visit note below.    Chief Complaint   Patient presents with     Diabetic Eye Exam    Prediabetic   Lab Results   Component Value Date    A1C 5.9 05/09/2025    A1C 5.7 07/10/2024       Accompanied by wife who is helping interpret   Last Eye Exam: 4 years ago   Dilated Previously: Yes, side effects of dilation explained today    What are you currently using to see?  glasses       Distance Vision Acuity: Noticed gradual change in both eyes    Near Vision Acuity: Not satisfied with glasses     Eye Comfort: itchy  Do you use eye drops? : No      Lesly Bryant - Optometric Assistant           Medical, surgical and family histories reviewed and updated 5/27/2025.       OBJECTIVE: See Ophthalmology exam    ASSESSMENT:    ICD-10-CM    1. Type 2 diabetes mellitus without retinopathy (H)  E11.9 EYE EXAM (SIMPLE-NONBILLABLE)      2. Myopia of both eyes with astigmatism  H52.13 EYE EXAM (SIMPLE-NONBILLABLE)    H52.203 REFRACTION      3. Presbyopia  H52.4 REFRACTION      4. Pinguecula of both eyes  H11.153           PLAN:   Artificial tears  as needed   Ongoing glucose control   Odette Garcia OD     Again, thank you for allowing me to participate in the care of your patient.        Sincerely,        Odette Garcia, OD    Electronically signed

## 2025-05-27 NOTE — PROGRESS NOTES
Chief Complaint   Patient presents with    Diabetic Eye Exam    Prediabetic   Lab Results   Component Value Date    A1C 5.9 05/09/2025    A1C 5.7 07/10/2024       Accompanied by wife who is helping interpret   Last Eye Exam: 4 years ago   Dilated Previously: Yes, side effects of dilation explained today    What are you currently using to see?  glasses       Distance Vision Acuity: Noticed gradual change in both eyes    Near Vision Acuity: Not satisfied with glasses     Eye Comfort: itchy  Do you use eye drops? : No      Lesly Bryant - Optometric Assistant           Medical, surgical and family histories reviewed and updated 5/27/2025.       OBJECTIVE: See Ophthalmology exam    ASSESSMENT:    ICD-10-CM    1. Type 2 diabetes mellitus without retinopathy (H)  E11.9 EYE EXAM (SIMPLE-NONBILLABLE)      2. Myopia of both eyes with astigmatism  H52.13 EYE EXAM (SIMPLE-NONBILLABLE)    H52.203 REFRACTION      3. Presbyopia  H52.4 REFRACTION      4. Pinguecula of both eyes  H11.153           PLAN:   Artificial tears  as needed   Ongoing glucose control   Odette Garcia OD

## 2025-06-03 ENCOUNTER — HOSPITAL ENCOUNTER (OUTPATIENT)
Facility: CLINIC | Age: 55
Discharge: HOME | End: 2025-06-03
Attending: COLON & RECTAL SURGERY | Admitting: COLON & RECTAL SURGERY
Payer: COMMERCIAL

## 2025-06-03 VITALS
SYSTOLIC BLOOD PRESSURE: 113 MMHG | OXYGEN SATURATION: 97 % | HEART RATE: 82 BPM | HEIGHT: 66 IN | WEIGHT: 214 LBS | TEMPERATURE: 97 F | BODY MASS INDEX: 34.39 KG/M2 | DIASTOLIC BLOOD PRESSURE: 84 MMHG | RESPIRATION RATE: 16 BRPM

## 2025-06-03 LAB
COLONOSCOPY: NORMAL
GLUCOSE BLDC GLUCOMTR-MCNC: 103 MG/DL (ref 70–99)

## 2025-06-03 PROCEDURE — 45385 COLONOSCOPY W/LESION REMOVAL: CPT | Performed by: COLON & RECTAL SURGERY

## 2025-06-03 PROCEDURE — G0500 MOD SEDAT ENDO SERVICE >5YRS: HCPCS | Performed by: COLON & RECTAL SURGERY

## 2025-06-03 PROCEDURE — 88305 TISSUE EXAM BY PATHOLOGIST: CPT | Mod: TC | Performed by: COLON & RECTAL SURGERY

## 2025-06-03 PROCEDURE — 99153 MOD SED SAME PHYS/QHP EA: CPT | Performed by: COLON & RECTAL SURGERY

## 2025-06-03 PROCEDURE — 82962 GLUCOSE BLOOD TEST: CPT

## 2025-06-03 PROCEDURE — 250N000011 HC RX IP 250 OP 636: Mod: JZ | Performed by: COLON & RECTAL SURGERY

## 2025-06-03 RX ORDER — FENTANYL CITRATE 50 UG/ML
25-100 INJECTION, SOLUTION INTRAMUSCULAR; INTRAVENOUS EVERY 5 MIN PRN
Refills: 0 | Status: DISCONTINUED | OUTPATIENT
Start: 2025-06-03 | End: 2025-06-03 | Stop reason: HOSPADM

## 2025-06-03 RX ORDER — ONDANSETRON 4 MG/1
4 TABLET, ORALLY DISINTEGRATING ORAL EVERY 6 HOURS PRN
Status: DISCONTINUED | OUTPATIENT
Start: 2025-06-03 | End: 2025-06-03 | Stop reason: HOSPADM

## 2025-06-03 RX ORDER — NALOXONE HYDROCHLORIDE 0.4 MG/ML
0.4 INJECTION, SOLUTION INTRAMUSCULAR; INTRAVENOUS; SUBCUTANEOUS
Status: DISCONTINUED | OUTPATIENT
Start: 2025-06-03 | End: 2025-06-03 | Stop reason: HOSPADM

## 2025-06-03 RX ORDER — NALOXONE HYDROCHLORIDE 0.4 MG/ML
0.2 INJECTION, SOLUTION INTRAMUSCULAR; INTRAVENOUS; SUBCUTANEOUS
Status: DISCONTINUED | OUTPATIENT
Start: 2025-06-03 | End: 2025-06-03 | Stop reason: HOSPADM

## 2025-06-03 RX ORDER — EPINEPHRINE 1 MG/ML
0.1 INJECTION, SOLUTION, CONCENTRATE INTRAVENOUS
Status: DISCONTINUED | OUTPATIENT
Start: 2025-06-03 | End: 2025-06-03 | Stop reason: HOSPADM

## 2025-06-03 RX ORDER — LIDOCAINE 40 MG/G
CREAM TOPICAL
Status: DISCONTINUED | OUTPATIENT
Start: 2025-06-03 | End: 2025-06-03 | Stop reason: HOSPADM

## 2025-06-03 RX ORDER — ONDANSETRON 2 MG/ML
4 INJECTION INTRAMUSCULAR; INTRAVENOUS
Status: DISCONTINUED | OUTPATIENT
Start: 2025-06-03 | End: 2025-06-03 | Stop reason: HOSPADM

## 2025-06-03 RX ORDER — DIPHENHYDRAMINE HYDROCHLORIDE 50 MG/ML
25-50 INJECTION, SOLUTION INTRAMUSCULAR; INTRAVENOUS
Status: DISCONTINUED | OUTPATIENT
Start: 2025-06-03 | End: 2025-06-03 | Stop reason: HOSPADM

## 2025-06-03 RX ORDER — ONDANSETRON 2 MG/ML
4 INJECTION INTRAMUSCULAR; INTRAVENOUS EVERY 6 HOURS PRN
Status: DISCONTINUED | OUTPATIENT
Start: 2025-06-03 | End: 2025-06-03 | Stop reason: HOSPADM

## 2025-06-03 RX ORDER — SIMETHICONE 40MG/0.6ML
133 SUSPENSION, DROPS(FINAL DOSAGE FORM)(ML) ORAL
Status: DISCONTINUED | OUTPATIENT
Start: 2025-06-03 | End: 2025-06-03 | Stop reason: HOSPADM

## 2025-06-03 RX ORDER — SODIUM CHLORIDE, SODIUM LACTATE, POTASSIUM CHLORIDE, CALCIUM CHLORIDE 600; 310; 30; 20 MG/100ML; MG/100ML; MG/100ML; MG/100ML
INJECTION, SOLUTION INTRAVENOUS CONTINUOUS
Status: DISCONTINUED | OUTPATIENT
Start: 2025-06-03 | End: 2025-06-03 | Stop reason: HOSPADM

## 2025-06-03 RX ORDER — FLUMAZENIL 0.1 MG/ML
0.2 INJECTION, SOLUTION INTRAVENOUS
Status: DISCONTINUED | OUTPATIENT
Start: 2025-06-03 | End: 2025-06-03 | Stop reason: HOSPADM

## 2025-06-03 RX ORDER — PROCHLORPERAZINE MALEATE 10 MG
10 TABLET ORAL EVERY 6 HOURS PRN
Status: DISCONTINUED | OUTPATIENT
Start: 2025-06-03 | End: 2025-06-03 | Stop reason: HOSPADM

## 2025-06-03 RX ORDER — ATROPINE SULFATE 0.1 MG/ML
1 INJECTION INTRAVENOUS
Status: DISCONTINUED | OUTPATIENT
Start: 2025-06-03 | End: 2025-06-03 | Stop reason: HOSPADM

## 2025-06-03 RX ADMIN — FENTANYL CITRATE 100 MCG: 50 INJECTION, SOLUTION INTRAMUSCULAR; INTRAVENOUS at 07:35

## 2025-06-03 RX ADMIN — MIDAZOLAM 2 MG: 1 INJECTION INTRAMUSCULAR; INTRAVENOUS at 07:35

## 2025-06-03 ASSESSMENT — ACTIVITIES OF DAILY LIVING (ADL)
ADLS_ACUITY_SCORE: 41
ADLS_ACUITY_SCORE: 41

## 2025-06-03 NOTE — H&P
Pre-Endoscopy History and Physical     Zeke Treadwell MRN# [id number]   YOB: 1970 Age: 55 year old     Date of Procedure: 6/3/2025  Primary care provider: Milly Peoples  Type of Endoscopy: colonoscopy  Reason for Procedure: surveillance  Type of Anesthesia Anticipated: Moderate Sedation    HPI:    Zeke is a 55 year old male who will be undergoing the above procedure.      A history and physical has been performed. The patient's medications and allergies have been reviewed. The risks and benefits of the procedure and the sedation options and risks were discussed with the patient.  All questions were answered and informed consent was obtained.      He denies a personal or family history of anesthesia complications or bleeding disorders.     No Known Allergies     Prior to Admission Medications   Prescriptions Last Dose Informant Patient Reported? Taking?   LORazepam (ATIVAN) 1 MG tablet   No No   Sig: Take 1 tablet (1 mg) by mouth every 6 hours as needed for anxiety.   atorvastatin (LIPITOR) 20 MG tablet   No No   Sig: Take 1 tablet (20 mg) by mouth at bedtime.   bisacodyl (DULCOLAX) 5 MG EC tablet   No No   Sig: Take 2 tablets at 3 pm the day before your procedure. If your procedure is before 11 am, take 2 additional tablets at 11 pm. If your procedure is after 11 am, take 2 additional tablets at 6 am. For additional instructions refer to your colonoscopy prep instructions.   clobetasol (TEMOVATE) 0.05 % external ointment   No No   Sig: Apply topically 2 times daily.   cyclobenzaprine (FLEXERIL) 10 MG tablet   No No   Sig: Take 1 tablet (10 mg) by mouth 3 times daily as needed for muscle spasms   lisinopril-hydrochlorothiazide (ZESTORETIC) 10-12.5 MG tablet 6/3/2025 Morning  No Yes   Sig: Take 1 tablet by mouth daily.   metFORMIN (GLUCOPHAGE XR) 500 MG 24 hr tablet Past Week  No Yes   Sig: Take 2 tablets (1,000 mg) by mouth daily.   methocarbamol (ROBAXIN) 750 MG tablet   No No   Sig: Take 1  "tablet (750 mg) by mouth 4 times daily as needed.   methylPREDNISolone (MEDROL DOSEPAK) 4 MG tablet therapy pack   No No   Sig: Follow Package Directions   Patient not taking: Reported on 7/9/2024   polyethylene glycol (GOLYTELY) 236 g suspension   No No   Sig: The night before the exam at 6 pm drink an 8-ounce glass every 15 minutes until the jug is half empty. If you arrive before 11 AM: Drink the other half of the Golytely jug at 11 PM night before procedure. If you arrive after 11 AM: Drink the other half of the Golytely jug at 6 AM day of procedure. For additional instructions refer to your colonoscopy prep instructions.   tamsulosin (FLOMAX) 0.4 MG capsule   No No   Sig: Take 1 capsule (0.4 mg) by mouth daily.   triamcinolone (KENALOG) 0.1 % external ointment   No No   Sig: Apply topically 2 times daily      Facility-Administered Medications: None       Patient Active Problem List   Diagnosis    Dyshidrotic eczema    Essential hypertension    Prediabetes    Back pain without radiation        Past Medical History:   Diagnosis Date    Bone infection, shoulder region (H)     right        Past Surgical History:   Procedure Laterality Date    APPENDECTOMY         Social History     Tobacco Use    Smoking status: Never     Passive exposure: Never    Smokeless tobacco: Never   Substance Use Topics    Alcohol use: No       Family History   Problem Relation Age of Onset    Colon Cancer No family hx of        REVIEW OF SYSTEMS:     5 point ROS negative except as noted above in HPI, including Gen., Resp., CV, GI &  system review.      PHYSICAL EXAM:   BP (!) 140/86 (Cuff Size: Adult Regular)   Pulse 81   Temp 97  F (36.1  C) (Temporal)   Resp 18   Ht 1.664 m (5' 5.5\")   Wt 97.1 kg (214 lb)   SpO2 97%   BMI 35.07 kg/m   Estimated body mass index is 35.07 kg/m  as calculated from the following:    Height as of this encounter: 1.664 m (5' 5.5\").    Weight as of this encounter: 97.1 kg (214 lb).   GENERAL " APPEARANCE: healthy and alert  MENTAL STATUS: alert  AIRWAY EXAM: Mallampatti Class II (visualization of the soft palate, fauces, and uvula)  RESP: lungs clear to auscultation - no rales, rhonchi or wheezes  CV: regular rates and rhythm      DIAGNOSTICS:    Not indicated      IMPRESSION   ASA Class 2 - Mild systemic disease        PLAN:       Plan for colonoscopy. We discussed the risks, benefits and alternatives and the patient wished to proceed.    The above has been forwarded to the consulting provider.      Signed Electronically by: Silvia Baker MD  Xiomara 3, 2025

## 2025-06-04 ENCOUNTER — RESULTS FOLLOW-UP (OUTPATIENT)
Dept: SURGERY | Facility: CLINIC | Age: 55
End: 2025-06-04

## 2025-06-04 LAB
PATH REPORT.COMMENTS IMP SPEC: NORMAL
PATH REPORT.COMMENTS IMP SPEC: NORMAL
PATH REPORT.FINAL DX SPEC: NORMAL
PATH REPORT.GROSS SPEC: NORMAL
PATH REPORT.MICROSCOPIC SPEC OTHER STN: NORMAL
PATH REPORT.RELEVANT HX SPEC: NORMAL
PHOTO IMAGE: NORMAL

## 2025-06-04 PROCEDURE — 88305 TISSUE EXAM BY PATHOLOGIST: CPT | Mod: 26 | Performed by: PATHOLOGY

## 2025-07-09 ENCOUNTER — OFFICE VISIT (OUTPATIENT)
Dept: PEDIATRICS | Facility: CLINIC | Age: 55
End: 2025-07-09
Payer: COMMERCIAL

## 2025-07-09 VITALS
DIASTOLIC BLOOD PRESSURE: 85 MMHG | SYSTOLIC BLOOD PRESSURE: 123 MMHG | WEIGHT: 211.3 LBS | RESPIRATION RATE: 20 BRPM | OXYGEN SATURATION: 97 % | HEART RATE: 83 BPM | BODY MASS INDEX: 34.63 KG/M2 | TEMPERATURE: 98.4 F

## 2025-07-09 DIAGNOSIS — R19.7 DIARRHEA, UNSPECIFIED TYPE: ICD-10-CM

## 2025-07-09 DIAGNOSIS — R10.31 RLQ ABDOMINAL PAIN: ICD-10-CM

## 2025-07-09 DIAGNOSIS — K92.1 HEMATOCHEZIA: ICD-10-CM

## 2025-07-09 DIAGNOSIS — R10.13 ABDOMINAL PAIN, EPIGASTRIC: Primary | ICD-10-CM

## 2025-07-09 LAB
ALBUMIN UR-MCNC: NEGATIVE MG/DL
APPEARANCE UR: CLEAR
BASOPHILS # BLD AUTO: 0.1 10E3/UL (ref 0–0.2)
BASOPHILS NFR BLD AUTO: 1 %
BILIRUB UR QL STRIP: NEGATIVE
COLOR UR AUTO: YELLOW
EOSINOPHIL # BLD AUTO: 0.2 10E3/UL (ref 0–0.7)
EOSINOPHIL NFR BLD AUTO: 2 %
ERYTHROCYTE [DISTWIDTH] IN BLOOD BY AUTOMATED COUNT: 12.1 % (ref 10–15)
ERYTHROCYTE [SEDIMENTATION RATE] IN BLOOD BY WESTERGREN METHOD: 8 MM/HR (ref 0–20)
GLUCOSE UR STRIP-MCNC: NEGATIVE MG/DL
HCT VFR BLD AUTO: 44.8 % (ref 40–53)
HGB BLD-MCNC: 15.3 G/DL (ref 13.3–17.7)
HGB UR QL STRIP: NEGATIVE
IMM GRANULOCYTES # BLD: 0 10E3/UL
IMM GRANULOCYTES NFR BLD: 0 %
KETONES UR STRIP-MCNC: NEGATIVE MG/DL
LEUKOCYTE ESTERASE UR QL STRIP: NEGATIVE
LYMPHOCYTES # BLD AUTO: 1.9 10E3/UL (ref 0.8–5.3)
LYMPHOCYTES NFR BLD AUTO: 22 %
MCH RBC QN AUTO: 29.6 PG (ref 26.5–33)
MCHC RBC AUTO-ENTMCNC: 34.2 G/DL (ref 31.5–36.5)
MCV RBC AUTO: 87 FL (ref 78–100)
MONOCYTES # BLD AUTO: 0.9 10E3/UL (ref 0–1.3)
MONOCYTES NFR BLD AUTO: 11 %
NEUTROPHILS # BLD AUTO: 5.4 10E3/UL (ref 1.6–8.3)
NEUTROPHILS NFR BLD AUTO: 64 %
NITRATE UR QL: NEGATIVE
PH UR STRIP: 6.5 [PH] (ref 5–7)
PLATELET # BLD AUTO: 306 10E3/UL (ref 150–450)
RBC # BLD AUTO: 5.17 10E6/UL (ref 4.4–5.9)
SP GR UR STRIP: 1.02 (ref 1–1.03)
UROBILINOGEN UR STRIP-ACNC: 0.2 E.U./DL
WBC # BLD AUTO: 8.3 10E3/UL (ref 4–11)

## 2025-07-09 PROCEDURE — 83690 ASSAY OF LIPASE: CPT | Performed by: PHYSICIAN ASSISTANT

## 2025-07-09 PROCEDURE — 86140 C-REACTIVE PROTEIN: CPT | Performed by: PHYSICIAN ASSISTANT

## 2025-07-09 PROCEDURE — 81003 URINALYSIS AUTO W/O SCOPE: CPT | Performed by: PHYSICIAN ASSISTANT

## 2025-07-09 PROCEDURE — 85652 RBC SED RATE AUTOMATED: CPT | Performed by: PHYSICIAN ASSISTANT

## 2025-07-09 PROCEDURE — 85025 COMPLETE CBC W/AUTO DIFF WBC: CPT | Performed by: PHYSICIAN ASSISTANT

## 2025-07-09 PROCEDURE — 80053 COMPREHEN METABOLIC PANEL: CPT | Performed by: PHYSICIAN ASSISTANT

## 2025-07-09 PROCEDURE — 36415 COLL VENOUS BLD VENIPUNCTURE: CPT | Performed by: PHYSICIAN ASSISTANT

## 2025-07-09 ASSESSMENT — PAIN SCALES - GENERAL: PAINLEVEL_OUTOF10: NO PAIN (0)

## 2025-07-09 NOTE — PROGRESS NOTES
{PROVIDER CHARTING PREFERENCE:618934}    Preethi Alanis is a 55 year old, presenting for the following health issues:  Abdominal Pain (/)        7/9/2025     3:34 PM   Additional Questions   Roomed by [facility]   Accompanied by self     History of Present Illness       Reason for visit:  Illness  Symptom onset:  3-4 weeks ago  Symptoms include:  O  Symptom intensity:  Moderate  Symptom progression:  Staying the same  Had these symptoms before:  Yes  Has tried/received treatment for these symptoms:  No  What makes it worse:  O  What makes it better:  O   He is taking medications regularly.      Pain History:  Where in your body do you have pain? Abdominal/Flank Pain  Onset/Duration: about more month ago  Description:   Character: Stabbing and pulsing  Location: right lower quadrant  Radiation: Back  Intensity: severe  Progression of Symptoms:  worsening and intermittent  Occurs every few days. Patient has diarrhea with BRB. Lasts for one day.   Accompanying Signs & Symptoms:  Fever/Chills: YES- had chills   Gas/Bloating: No  Nausea: YES  Vomitting: No  Diarrhea: YES- blood   Constipation: No  Dysuria or Hematuria: YES  History:   Trauma: No  Previous similar pain: YES  Previous tests done: Colonoscopy screening one month ago and normal. Repeat in 5 years.   Precipitating factors:   Does the pain change with:     Food: YES--spicy sauces makes symptoms worse  Patient states when he takes metformin he has abd pain that radiates to back.   Patient has been taking for 1.5-2 years    Bowel Movement: YES    Urination: No   Other factors:  No  Therapies tried and outcome: None  Abd surgeries: appendectomy.      Review of Systems  Constitutional, HEENT, cardiovascular, pulmonary, gi and gu systems are negative, except as otherwise noted.      Objective    /85 (BP Location: Right arm, Patient Position: Sitting, Cuff Size: Adult Large)   Pulse 83   Temp 98.4  F (36.9  C) (Temporal)   Resp 20   Wt 95.8 kg (211 lb 4.8  oz)   SpO2 97%   BMI 34.63 kg/m    Body mass index is 34.63 kg/m .  Physical Exam   {Exam List (Optional):312285}    {Diagnostic Test Results (Optional):336516}        Signed Electronically by: Magnolia Brandt PA-C  {Email feedback regarding this note to [email or web address]   :[id number]}

## 2025-07-10 LAB
ALBUMIN SERPL BCG-MCNC: 4.2 G/DL (ref 3.5–5.2)
ALP SERPL-CCNC: 104 U/L (ref 40–150)
ALT SERPL W P-5'-P-CCNC: 43 U/L (ref 0–70)
ANION GAP SERPL CALCULATED.3IONS-SCNC: 10 MMOL/L (ref 7–15)
AST SERPL W P-5'-P-CCNC: 35 U/L (ref 0–45)
BILIRUB SERPL-MCNC: 0.6 MG/DL
BUN SERPL-MCNC: 16.8 MG/DL (ref 6–20)
CALCIUM SERPL-MCNC: 9.4 MG/DL (ref 8.8–10.4)
CHLORIDE SERPL-SCNC: 103 MMOL/L (ref 98–107)
CREAT SERPL-MCNC: 0.89 MG/DL (ref 0.67–1.17)
CRP SERPL-MCNC: <3 MG/L
EGFRCR SERPLBLD CKD-EPI 2021: >90 ML/MIN/1.73M2
GLUCOSE SERPL-MCNC: 113 MG/DL (ref 70–99)
HCO3 SERPL-SCNC: 26 MMOL/L (ref 22–29)
LIPASE SERPL-CCNC: 23 U/L (ref 13–60)
POTASSIUM SERPL-SCNC: 3.6 MMOL/L (ref 3.4–5.3)
PROT SERPL-MCNC: 7.6 G/DL (ref 6.4–8.3)
SODIUM SERPL-SCNC: 139 MMOL/L (ref 135–145)

## 2025-07-14 LAB
C CAYETANENSIS DNA STL QL NAA+NON-PROBE: NEGATIVE
CAMPYLOBACTER DNA SPEC NAA+PROBE: NEGATIVE
CRYPTOSP DNA STL QL NAA+NON-PROBE: NEGATIVE
EC STX1+STX2 GENES STL QL NAA+NON-PROBE: NEGATIVE
G LAMBLIA DNA STL QL NAA+NON-PROBE: NEGATIVE
NOROVIRUS GI+II RNA STL QL NAA+NON-PROBE: NEGATIVE
SALMONELLA SP RPOD STL QL NAA+PROBE: NEGATIVE
SHIGELLA SP+EIEC IPAH ST NAA+NON-PROBE: NEGATIVE
VIBRIO DNA SPEC NAA+PROBE: NEGATIVE
Y ENTEROCOL DNA STL QL NAA+PROBE: NEGATIVE

## 2025-07-15 LAB — H PYLORI AG STL QL IA: NEGATIVE

## 2025-08-30 ENCOUNTER — HEALTH MAINTENANCE LETTER (OUTPATIENT)
Age: 55
End: 2025-08-30

## (undated) DEVICE — ENDO SNARE POLYPECTOMY OVAL 15MM LOOP SD-240U-15

## (undated) DEVICE — ENDO TRAP POLYP QUICK CATCH 710201

## (undated) RX ORDER — FENTANYL CITRATE 50 UG/ML
INJECTION, SOLUTION INTRAMUSCULAR; INTRAVENOUS
Status: DISPENSED
Start: 2025-06-03